# Patient Record
Sex: FEMALE | Race: WHITE | Employment: OTHER | URBAN - METROPOLITAN AREA
[De-identification: names, ages, dates, MRNs, and addresses within clinical notes are randomized per-mention and may not be internally consistent; named-entity substitution may affect disease eponyms.]

---

## 2019-01-01 ENCOUNTER — HOSPICE ADMISSION (OUTPATIENT)
Dept: HOSPICE | Facility: HOSPICE | Age: 74
End: 2019-01-01
Payer: MEDICARE

## 2019-01-01 ENCOUNTER — APPOINTMENT (OUTPATIENT)
Dept: GENERAL RADIOLOGY | Age: 74
DRG: 871 | End: 2019-01-01
Attending: FAMILY MEDICINE
Payer: MEDICARE

## 2019-01-01 ENCOUNTER — HOSPITAL ENCOUNTER (INPATIENT)
Age: 74
LOS: 6 days | Discharge: HOSPICE | DRG: 871 | End: 2019-09-18
Attending: EMERGENCY MEDICINE | Admitting: FAMILY MEDICINE
Payer: MEDICARE

## 2019-01-01 ENCOUNTER — APPOINTMENT (OUTPATIENT)
Dept: GENERAL RADIOLOGY | Age: 74
DRG: 871 | End: 2019-01-01
Attending: EMERGENCY MEDICINE
Payer: MEDICARE

## 2019-01-01 ENCOUNTER — APPOINTMENT (OUTPATIENT)
Dept: ULTRASOUND IMAGING | Age: 74
DRG: 871 | End: 2019-01-01
Attending: INTERNAL MEDICINE
Payer: MEDICARE

## 2019-01-01 ENCOUNTER — APPOINTMENT (OUTPATIENT)
Dept: GENERAL RADIOLOGY | Age: 74
DRG: 871 | End: 2019-01-01
Attending: INTERNAL MEDICINE
Payer: MEDICARE

## 2019-01-01 ENCOUNTER — HOSPITAL ENCOUNTER (INPATIENT)
Age: 74
LOS: 3 days | End: 2019-09-21
Attending: INTERNAL MEDICINE | Admitting: INTERNAL MEDICINE
Payer: MEDICARE

## 2019-01-01 VITALS
OXYGEN SATURATION: 96 % | TEMPERATURE: 101 F | WEIGHT: 142.2 LBS | RESPIRATION RATE: 16 BRPM | HEART RATE: 115 BPM | DIASTOLIC BLOOD PRESSURE: 52 MMHG | BODY MASS INDEX: 26.17 KG/M2 | SYSTOLIC BLOOD PRESSURE: 124 MMHG | HEIGHT: 62 IN

## 2019-01-01 VITALS
DIASTOLIC BLOOD PRESSURE: 51 MMHG | RESPIRATION RATE: 20 BRPM | HEART RATE: 103 BPM | SYSTOLIC BLOOD PRESSURE: 101 MMHG | TEMPERATURE: 98.9 F

## 2019-01-01 DIAGNOSIS — C25.8 OVERLAPPING MALIGNANT NEOPLASM OF PANCREAS (HCC): ICD-10-CM

## 2019-01-01 DIAGNOSIS — A41.9 SEPSIS, DUE TO UNSPECIFIED ORGANISM: ICD-10-CM

## 2019-01-01 DIAGNOSIS — M54.9 BILATERAL BACK PAIN, UNSPECIFIED BACK LOCATION, UNSPECIFIED CHRONICITY: ICD-10-CM

## 2019-01-01 DIAGNOSIS — R65.21 SEPTIC SHOCK (HCC): ICD-10-CM

## 2019-01-01 DIAGNOSIS — R91.8 PULMONARY INFILTRATES: ICD-10-CM

## 2019-01-01 DIAGNOSIS — R19.7 DIARRHEA, UNSPECIFIED TYPE: ICD-10-CM

## 2019-01-01 DIAGNOSIS — Z71.89 ADVANCED CARE PLANNING/COUNSELING DISCUSSION: ICD-10-CM

## 2019-01-01 DIAGNOSIS — N17.9 ACUTE KIDNEY INJURY (HCC): ICD-10-CM

## 2019-01-01 DIAGNOSIS — A41.9 SEVERE SEPSIS (HCC): Primary | ICD-10-CM

## 2019-01-01 DIAGNOSIS — R78.81 BACTEREMIA DUE TO GRAM-NEGATIVE BACTERIA: ICD-10-CM

## 2019-01-01 DIAGNOSIS — R53.81 DEBILITY: ICD-10-CM

## 2019-01-01 DIAGNOSIS — A41.9 SEPTIC SHOCK (HCC): ICD-10-CM

## 2019-01-01 DIAGNOSIS — J18.9 PNEUMONIA OF LEFT LOWER LOBE DUE TO INFECTIOUS ORGANISM: ICD-10-CM

## 2019-01-01 DIAGNOSIS — Z51.5 ENCOUNTER FOR PALLIATIVE CARE: ICD-10-CM

## 2019-01-01 DIAGNOSIS — R65.20 SEVERE SEPSIS (HCC): Primary | ICD-10-CM

## 2019-01-01 DIAGNOSIS — R41.82 ALTERED MENTAL STATUS, UNSPECIFIED ALTERED MENTAL STATUS TYPE: ICD-10-CM

## 2019-01-01 DIAGNOSIS — R62.7 FAILURE TO THRIVE IN ADULT: ICD-10-CM

## 2019-01-01 LAB
ACC. NO. FROM MICRO ORDER, ACCP: ABNORMAL
ALBUMIN SERPL-MCNC: 2.5 G/DL (ref 3.2–4.6)
ALBUMIN/GLOB SERPL: 0.8 {RATIO} (ref 1.2–3.5)
ALP SERPL-CCNC: 228 U/L (ref 50–136)
ALT SERPL-CCNC: 48 U/L (ref 12–65)
ANION GAP SERPL CALC-SCNC: 12 MMOL/L (ref 7–16)
ANION GAP SERPL CALC-SCNC: 13 MMOL/L (ref 7–16)
ANION GAP SERPL CALC-SCNC: 13 MMOL/L (ref 7–16)
ANION GAP SERPL CALC-SCNC: 15 MMOL/L (ref 7–16)
ANION GAP SERPL CALC-SCNC: 16 MMOL/L (ref 7–16)
ANION GAP SERPL CALC-SCNC: 21 MMOL/L (ref 7–16)
APPEARANCE UR: ABNORMAL
ARTERIAL PATENCY WRIST A: YES
AST SERPL-CCNC: 109 U/L (ref 15–37)
ATRIAL RATE: 138 BPM
BACTERIA SPEC CULT: ABNORMAL
BACTERIA SPEC CULT: ABNORMAL
BACTERIA SPEC CULT: NORMAL
BACTERIA SPEC CULT: NORMAL
BACTERIA URNS QL MICRO: 0 /HPF
BASE DEFICIT BLD-SCNC: 1 MMOL/L
BASE DEFICIT BLD-SCNC: 15 MMOL/L
BASE DEFICIT BLD-SCNC: 3 MMOL/L
BASE DEFICIT BLD-SCNC: 4 MMOL/L
BASOPHILS # BLD: 0 K/UL (ref 0–0.2)
BASOPHILS # BLD: 0.1 K/UL (ref 0–0.2)
BASOPHILS # BLD: 0.2 K/UL (ref 0–0.2)
BASOPHILS # BLD: 0.3 K/UL (ref 0–0.2)
BASOPHILS NFR BLD: 0 % (ref 0–2)
BASOPHILS NFR BLD: 1 % (ref 0–2)
BDY SITE: ABNORMAL
BDY SITE: NORMAL
BILIRUB SERPL-MCNC: 1.4 MG/DL (ref 0.2–1.1)
BILIRUB UR QL: NEGATIVE
BNP SERPL-MCNC: 4136 PG/ML
BODY TEMPERATURE: 98.6
BUN SERPL-MCNC: 18 MG/DL (ref 8–23)
BUN SERPL-MCNC: 22 MG/DL (ref 8–23)
BUN SERPL-MCNC: 25 MG/DL (ref 8–23)
BUN SERPL-MCNC: 28 MG/DL (ref 8–23)
BUN SERPL-MCNC: 41 MG/DL (ref 8–23)
BUN SERPL-MCNC: 61 MG/DL (ref 8–23)
C DIFF GDH STL QL: NORMAL
C DIFF TOX A+B STL QL IA: NORMAL
CALCIUM SERPL-MCNC: 6.9 MG/DL (ref 8.3–10.4)
CALCIUM SERPL-MCNC: 7 MG/DL (ref 8.3–10.4)
CALCIUM SERPL-MCNC: 7.1 MG/DL (ref 8.3–10.4)
CALCIUM SERPL-MCNC: 7.3 MG/DL (ref 8.3–10.4)
CALCIUM SERPL-MCNC: 7.9 MG/DL (ref 8.3–10.4)
CALCIUM SERPL-MCNC: 8.5 MG/DL (ref 8.3–10.4)
CALCULATED P AXIS, ECG09: 112 DEGREES
CALCULATED R AXIS, ECG10: 84 DEGREES
CALCULATED T AXIS, ECG11: 63 DEGREES
CASTS URNS QL MICRO: ABNORMAL /LPF
CHLORIDE SERPL-SCNC: 100 MMOL/L (ref 98–107)
CHLORIDE SERPL-SCNC: 100 MMOL/L (ref 98–107)
CHLORIDE SERPL-SCNC: 102 MMOL/L (ref 98–107)
CHLORIDE SERPL-SCNC: 103 MMOL/L (ref 98–107)
CHLORIDE SERPL-SCNC: 104 MMOL/L (ref 98–107)
CHLORIDE SERPL-SCNC: 99 MMOL/L (ref 98–107)
CLINICAL CONSIDERATION: NORMAL
CO2 BLD-SCNC: 10 MMOL/L
CO2 BLD-SCNC: 24 MMOL/L
CO2 BLD-SCNC: 25 MMOL/L
CO2 BLD-SCNC: 26 MMOL/L
CO2 SERPL-SCNC: 14 MMOL/L (ref 21–32)
CO2 SERPL-SCNC: 19 MMOL/L (ref 21–32)
CO2 SERPL-SCNC: 22 MMOL/L (ref 21–32)
CO2 SERPL-SCNC: 23 MMOL/L (ref 21–32)
CO2 SERPL-SCNC: 24 MMOL/L (ref 21–32)
CO2 SERPL-SCNC: 26 MMOL/L (ref 21–32)
COLLECT TIME,HTIME: 1750
COLLECT TIME,HTIME: 1940
COLLECT TIME,HTIME: 2335
COLLECT TIME,HTIME: 555
COLOR UR: YELLOW
CREAT SERPL-MCNC: 2.2 MG/DL (ref 0.6–1)
CREAT SERPL-MCNC: 2.5 MG/DL (ref 0.6–1)
CREAT SERPL-MCNC: 2.86 MG/DL (ref 0.6–1)
CREAT SERPL-MCNC: 3.18 MG/DL (ref 0.6–1)
CREAT SERPL-MCNC: 3.96 MG/DL (ref 0.6–1)
CREAT SERPL-MCNC: 4.75 MG/DL (ref 0.6–1)
CREAT UR-MCNC: 69.7 MG/DL
DIAGNOSIS, 93000: NORMAL
DIFFERENTIAL METHOD BLD: ABNORMAL
EOSINOPHIL # BLD: 0 K/UL (ref 0–0.8)
EOSINOPHIL # BLD: 0.1 K/UL (ref 0–0.8)
EOSINOPHIL # BLD: 0.1 K/UL (ref 0–0.8)
EOSINOPHIL # BLD: 1.2 K/UL (ref 0–0.8)
EOSINOPHIL NFR BLD: 0 % (ref 0.5–7.8)
EOSINOPHIL NFR BLD: 0 % (ref 0.5–7.8)
EOSINOPHIL NFR BLD: 1 % (ref 0.5–7.8)
EOSINOPHIL NFR BLD: 40 % (ref 0.5–7.8)
EPI CELLS #/AREA URNS HPF: ABNORMAL /HPF
ERYTHROCYTE [DISTWIDTH] IN BLOOD BY AUTOMATED COUNT: 13.7 % (ref 11.9–14.6)
ERYTHROCYTE [DISTWIDTH] IN BLOOD BY AUTOMATED COUNT: 13.9 % (ref 11.9–14.6)
ERYTHROCYTE [DISTWIDTH] IN BLOOD BY AUTOMATED COUNT: 14 % (ref 11.9–14.6)
ERYTHROCYTE [DISTWIDTH] IN BLOOD BY AUTOMATED COUNT: 14 % (ref 11.9–14.6)
ERYTHROCYTE [DISTWIDTH] IN BLOOD BY AUTOMATED COUNT: 14.2 % (ref 11.9–14.6)
FLOW RATE ISTAT,IFRATE: 2 L/MIN
FLOW RATE ISTAT,IFRATE: 6 L/MIN
FLOW RATE ISTAT,IFRATE: 8 L/MIN
FLOW RATE ISTAT,IFRATE: 8 L/MIN
GAS FLOW.O2 O2 DELIVERY SYS: ABNORMAL L/MIN
GAS FLOW.O2 O2 DELIVERY SYS: NORMAL L/MIN
GLOBULIN SER CALC-MCNC: 3.1 G/DL (ref 2.3–3.5)
GLUCOSE SERPL-MCNC: 111 MG/DL (ref 65–100)
GLUCOSE SERPL-MCNC: 114 MG/DL (ref 65–100)
GLUCOSE SERPL-MCNC: 72 MG/DL (ref 65–100)
GLUCOSE SERPL-MCNC: 77 MG/DL (ref 65–100)
GLUCOSE SERPL-MCNC: 78 MG/DL (ref 65–100)
GLUCOSE SERPL-MCNC: 90 MG/DL (ref 65–100)
GLUCOSE UR STRIP.AUTO-MCNC: NEGATIVE MG/DL
GRAM STN SPEC: ABNORMAL
HCO3 BLD-SCNC: 23 MMOL/L (ref 22–26)
HCO3 BLD-SCNC: 23.1 MMOL/L (ref 22–26)
HCO3 BLD-SCNC: 25 MMOL/L (ref 22–26)
HCO3 BLD-SCNC: 9.5 MMOL/L (ref 22–26)
HCT VFR BLD AUTO: 27.7 % (ref 35.8–46.3)
HCT VFR BLD AUTO: 28.4 % (ref 35.8–46.3)
HCT VFR BLD AUTO: 30.5 % (ref 35.8–46.3)
HCT VFR BLD AUTO: 30.9 % (ref 35.8–46.3)
HCT VFR BLD AUTO: 33.2 % (ref 35.8–46.3)
HGB BLD-MCNC: 10.2 G/DL (ref 11.7–15.4)
HGB BLD-MCNC: 8.9 G/DL (ref 11.7–15.4)
HGB BLD-MCNC: 9.1 G/DL (ref 11.7–15.4)
HGB BLD-MCNC: 9.6 G/DL (ref 11.7–15.4)
HGB BLD-MCNC: 9.7 G/DL (ref 11.7–15.4)
HGB UR QL STRIP: ABNORMAL
IMM GRANULOCYTES # BLD AUTO: 0.1 K/UL (ref 0–0.5)
IMM GRANULOCYTES # BLD AUTO: 1.3 K/UL (ref 0–0.5)
IMM GRANULOCYTES # BLD AUTO: 1.4 K/UL (ref 0–0.5)
IMM GRANULOCYTES # BLD AUTO: 3.5 K/UL (ref 0–0.5)
IMM GRANULOCYTES NFR BLD AUTO: 12 % (ref 0–5)
IMM GRANULOCYTES NFR BLD AUTO: 4 % (ref 0–5)
IMM GRANULOCYTES NFR BLD AUTO: 5 % (ref 0–5)
IMM GRANULOCYTES NFR BLD AUTO: 5 % (ref 0–5)
INTERPRETATION: ABNORMAL
INTERPRETATION: NORMAL
KETONES UR QL STRIP.AUTO: NEGATIVE MG/DL
KLEBSIELLA PNEUMONIAE: DETECTED
KPC (CARBAPENEM RESISTANCE GENE): NOT DETECTED
LACTATE BLD-SCNC: 12.11 MMOL/L (ref 0.5–1.9)
LACTATE BLD-SCNC: 5.58 MMOL/L (ref 0.5–1.9)
LACTATE SERPL-SCNC: 1.1 MMOL/L (ref 0.4–2)
LACTATE SERPL-SCNC: 4.1 MMOL/L (ref 0.4–2)
LACTATE SERPL-SCNC: 4.4 MMOL/L (ref 0.4–2)
LACTATE SERPL-SCNC: 6.1 MMOL/L (ref 0.4–2)
LACTATE SERPL-SCNC: 7.2 MMOL/L (ref 0.4–2)
LEUKOCYTE ESTERASE UR QL STRIP.AUTO: NEGATIVE
LYMPHOCYTES # BLD: 0.7 K/UL (ref 0.5–4.6)
LYMPHOCYTES # BLD: 0.7 K/UL (ref 0.5–4.6)
LYMPHOCYTES # BLD: 1.2 K/UL (ref 0.5–4.6)
LYMPHOCYTES # BLD: 1.4 K/UL (ref 0.5–4.6)
LYMPHOCYTES NFR BLD: 2 % (ref 13–44)
LYMPHOCYTES NFR BLD: 26 % (ref 13–44)
LYMPHOCYTES NFR BLD: 4 % (ref 13–44)
LYMPHOCYTES NFR BLD: 5 % (ref 13–44)
MCH RBC QN AUTO: 27.6 PG (ref 26.1–32.9)
MCH RBC QN AUTO: 27.7 PG (ref 26.1–32.9)
MCH RBC QN AUTO: 27.9 PG (ref 26.1–32.9)
MCH RBC QN AUTO: 28 PG (ref 26.1–32.9)
MCH RBC QN AUTO: 28.3 PG (ref 26.1–32.9)
MCHC RBC AUTO-ENTMCNC: 30.7 G/DL (ref 31.4–35)
MCHC RBC AUTO-ENTMCNC: 31.4 G/DL (ref 31.4–35)
MCHC RBC AUTO-ENTMCNC: 31.5 G/DL (ref 31.4–35)
MCHC RBC AUTO-ENTMCNC: 32 G/DL (ref 31.4–35)
MCHC RBC AUTO-ENTMCNC: 32.1 G/DL (ref 31.4–35)
MCV RBC AUTO: 87.1 FL (ref 79.6–97.8)
MCV RBC AUTO: 88 FL (ref 79.6–97.8)
MCV RBC AUTO: 88.2 FL (ref 79.6–97.8)
MCV RBC AUTO: 88.2 FL (ref 79.6–97.8)
MCV RBC AUTO: 90.7 FL (ref 79.6–97.8)
METAMYELOCYTES NFR BLD MANUAL: 1 %
MONOCYTES # BLD: 0 K/UL (ref 0.1–1.3)
MONOCYTES # BLD: 0.4 K/UL (ref 0.1–1.3)
MONOCYTES # BLD: 0.6 K/UL (ref 0.1–1.3)
MONOCYTES # BLD: 1 K/UL (ref 0.1–1.3)
MONOCYTES # BLD: 1.4 K/UL (ref 0.1–1.3)
MONOCYTES NFR BLD MANUAL: 3 % (ref 3–9)
MONOCYTES NFR BLD: 1 % (ref 4–12)
MONOCYTES NFR BLD: 2 % (ref 4–12)
MONOCYTES NFR BLD: 2 % (ref 4–12)
MONOCYTES NFR BLD: 4 % (ref 4–12)
NEUTS BAND NFR BLD MANUAL: 10 % (ref 0–10)
NEUTS SEG # BLD: 0.8 K/UL (ref 1.7–8.2)
NEUTS SEG # BLD: 23 K/UL (ref 1.7–8.2)
NEUTS SEG # BLD: 24.9 K/UL (ref 1.7–8.2)
NEUTS SEG # BLD: 32.3 K/UL (ref 1.7–8.2)
NEUTS SEG # BLD: 32.4 K/UL (ref 1.7–8.2)
NEUTS SEG NFR BLD MANUAL: 86 % (ref 47–75)
NEUTS SEG NFR BLD: 28 % (ref 43–78)
NEUTS SEG NFR BLD: 80 % (ref 43–78)
NEUTS SEG NFR BLD: 89 % (ref 43–78)
NEUTS SEG NFR BLD: 90 % (ref 43–78)
NITRITE UR QL STRIP.AUTO: NEGATIVE
NRBC # BLD: 0 K/UL (ref 0–0.2)
NRBC # BLD: 0 K/UL (ref 0–0.2)
NRBC # BLD: 0.02 K/UL (ref 0–0.2)
NRBC # BLD: 0.02 K/UL (ref 0–0.2)
NRBC # BLD: 0.04 K/UL (ref 0–0.2)
P-R INTERVAL, ECG05: 132 MS
PCO2 BLD: 18.9 MMHG (ref 35–45)
PCO2 BLD: 44.1 MMHG (ref 35–45)
PCO2 BLD: 44.2 MMHG (ref 35–45)
PCO2 BLD: 50.3 MMHG (ref 35–45)
PCR REFLEX: NORMAL
PH BLD: 7.27 [PH] (ref 7.35–7.45)
PH BLD: 7.31 [PH] (ref 7.35–7.45)
PH BLD: 7.32 [PH] (ref 7.35–7.45)
PH BLD: 7.36 [PH] (ref 7.35–7.45)
PH UR STRIP: 6 [PH] (ref 5–9)
PLATELET # BLD AUTO: 185 K/UL (ref 150–450)
PLATELET # BLD AUTO: 201 K/UL (ref 150–450)
PLATELET # BLD AUTO: 255 K/UL (ref 150–450)
PLATELET # BLD AUTO: 259 K/UL (ref 150–450)
PLATELET # BLD AUTO: 296 K/UL (ref 150–450)
PLATELET COMMENTS,PCOM: ADEQUATE
PMV BLD AUTO: 10 FL (ref 9.4–12.3)
PMV BLD AUTO: 10.4 FL (ref 9.4–12.3)
PMV BLD AUTO: 9 FL (ref 9.4–12.3)
PO2 BLD: 78 MMHG (ref 75–100)
PO2 BLD: 78 MMHG (ref 75–100)
PO2 BLD: 83 MMHG (ref 75–100)
PO2 BLD: 94 MMHG (ref 75–100)
POTASSIUM SERPL-SCNC: 3.1 MMOL/L (ref 3.5–5.1)
POTASSIUM SERPL-SCNC: 3.2 MMOL/L (ref 3.5–5.1)
POTASSIUM SERPL-SCNC: 3.3 MMOL/L (ref 3.5–5.1)
POTASSIUM SERPL-SCNC: 3.5 MMOL/L (ref 3.5–5.1)
POTASSIUM SERPL-SCNC: 3.9 MMOL/L (ref 3.5–5.1)
POTASSIUM SERPL-SCNC: 4.4 MMOL/L (ref 3.5–5.1)
PROCALCITONIN SERPL-MCNC: 31.5 NG/ML
PROT SERPL-MCNC: 5.6 G/DL (ref 6.3–8.2)
PROT UR STRIP-MCNC: 30 MG/DL
Q-T INTERVAL, ECG07: 364 MS
QRS DURATION, ECG06: 76 MS
QTC CALCULATION (BEZET), ECG08: 549 MS
RBC # BLD AUTO: 3.18 M/UL (ref 4.05–5.2)
RBC # BLD AUTO: 3.22 M/UL (ref 4.05–5.2)
RBC # BLD AUTO: 3.46 M/UL (ref 4.05–5.2)
RBC # BLD AUTO: 3.51 M/UL (ref 4.05–5.2)
RBC # BLD AUTO: 3.66 M/UL (ref 4.05–5.2)
RBC #/AREA URNS HPF: ABNORMAL /HPF
RBC MORPH BLD: ABNORMAL
SAO2 % BLD: 93 % (ref 95–98)
SAO2 % BLD: 95 % (ref 95–98)
SAO2 % BLD: 95 % (ref 95–98)
SAO2 % BLD: 97 % (ref 95–98)
SERVICE CMNT-IMP: ABNORMAL
SERVICE CMNT-IMP: NORMAL
SODIUM SERPL-SCNC: 136 MMOL/L (ref 136–145)
SODIUM SERPL-SCNC: 137 MMOL/L (ref 136–145)
SODIUM SERPL-SCNC: 138 MMOL/L (ref 136–145)
SODIUM SERPL-SCNC: 139 MMOL/L (ref 136–145)
SODIUM UR-SCNC: 81 MMOL/L
SP GR UR REFRACTOMETRY: 1.01 (ref 1–1.02)
SPECIMEN TYPE: ABNORMAL
SPECIMEN TYPE: NORMAL
UROBILINOGEN UR QL STRIP.AUTO: 0.2 EU/DL (ref 0.2–1)
VANCOMYCIN SERPL-MCNC: 17.7 UG/ML
VANCOMYCIN SERPL-MCNC: 22.5 UG/ML
VENTRICULAR RATE, ECG03: 137 BPM
WBC # BLD AUTO: 2.8 K/UL (ref 4.3–11.1)
WBC # BLD AUTO: 28.1 K/UL (ref 4.3–11.1)
WBC # BLD AUTO: 28.8 K/UL (ref 4.3–11.1)
WBC # BLD AUTO: 33.4 K/UL (ref 4.3–11.1)
WBC # BLD AUTO: 36 K/UL (ref 4.3–11.1)
WBC MORPH BLD: ABNORMAL
WBC MORPH BLD: ABNORMAL
WBC URNS QL MICRO: ABNORMAL /HPF

## 2019-01-01 PROCEDURE — 74011000258 HC RX REV CODE- 258: Performed by: INTERNAL MEDICINE

## 2019-01-01 PROCEDURE — 65270000029 HC RM PRIVATE

## 2019-01-01 PROCEDURE — 0656 HSPC GENERAL INPATIENT

## 2019-01-01 PROCEDURE — 74011250636 HC RX REV CODE- 250/636: Performed by: INTERNAL MEDICINE

## 2019-01-01 PROCEDURE — 77030020263 HC SOL INJ SOD CL0.9% LFCR 1000ML

## 2019-01-01 PROCEDURE — 36600 WITHDRAWAL OF ARTERIAL BLOOD: CPT

## 2019-01-01 PROCEDURE — 74011000258 HC RX REV CODE- 258: Performed by: FAMILY MEDICINE

## 2019-01-01 PROCEDURE — 77030018846 HC SOL IRR STRL H20 ICUM -A

## 2019-01-01 PROCEDURE — 74011000250 HC RX REV CODE- 250: Performed by: NURSE PRACTITIONER

## 2019-01-01 PROCEDURE — 71045 X-RAY EXAM CHEST 1 VIEW: CPT

## 2019-01-01 PROCEDURE — 77030005518 HC CATH URETH FOL 2W BARD -B

## 2019-01-01 PROCEDURE — 74011250636 HC RX REV CODE- 250/636: Performed by: NURSE PRACTITIONER

## 2019-01-01 PROCEDURE — 83605 ASSAY OF LACTIC ACID: CPT

## 2019-01-01 PROCEDURE — G0155 HHCP-SVS OF CSW,EA 15 MIN: HCPCS

## 2019-01-01 PROCEDURE — 74011000250 HC RX REV CODE- 250

## 2019-01-01 PROCEDURE — 74011000250 HC RX REV CODE- 250: Performed by: INTERNAL MEDICINE

## 2019-01-01 PROCEDURE — 77010033678 HC OXYGEN DAILY

## 2019-01-01 PROCEDURE — 02HV33Z INSERTION OF INFUSION DEVICE INTO SUPERIOR VENA CAVA, PERCUTANEOUS APPROACH: ICD-10-PCS | Performed by: INTERNAL MEDICINE

## 2019-01-01 PROCEDURE — 36592 COLLECT BLOOD FROM PICC: CPT

## 2019-01-01 PROCEDURE — 94640 AIRWAY INHALATION TREATMENT: CPT

## 2019-01-01 PROCEDURE — 76450000000

## 2019-01-01 PROCEDURE — 99223 1ST HOSP IP/OBS HIGH 75: CPT | Performed by: INTERNAL MEDICINE

## 2019-01-01 PROCEDURE — 87186 SC STD MICRODIL/AGAR DIL: CPT

## 2019-01-01 PROCEDURE — G0299 HHS/HOSPICE OF RN EA 15 MIN: HCPCS

## 2019-01-01 PROCEDURE — 77030027688 HC DRSG MEPILEX 16-48IN NO BORD MOLN -A

## 2019-01-01 PROCEDURE — 99223 1ST HOSP IP/OBS HIGH 75: CPT | Performed by: NURSE PRACTITIONER

## 2019-01-01 PROCEDURE — G0156 HHCP-SVS OF AIDE,EA 15 MIN: HCPCS

## 2019-01-01 PROCEDURE — 74011250636 HC RX REV CODE- 250/636: Performed by: FAMILY MEDICINE

## 2019-01-01 PROCEDURE — 36415 COLL VENOUS BLD VENIPUNCTURE: CPT

## 2019-01-01 PROCEDURE — 87150 DNA/RNA AMPLIFIED PROBE: CPT

## 2019-01-01 PROCEDURE — 77030019605

## 2019-01-01 PROCEDURE — 85025 COMPLETE CBC W/AUTO DIFF WBC: CPT

## 2019-01-01 PROCEDURE — 82570 ASSAY OF URINE CREATININE: CPT

## 2019-01-01 PROCEDURE — 80202 ASSAY OF VANCOMYCIN: CPT

## 2019-01-01 PROCEDURE — 82803 BLOOD GASES ANY COMBINATION: CPT

## 2019-01-01 PROCEDURE — 74011250637 HC RX REV CODE- 250/637: Performed by: FAMILY MEDICINE

## 2019-01-01 PROCEDURE — 87077 CULTURE AEROBIC IDENTIFY: CPT

## 2019-01-01 PROCEDURE — 74011000250 HC RX REV CODE- 250: Performed by: FAMILY MEDICINE

## 2019-01-01 PROCEDURE — 74011250637 HC RX REV CODE- 250/637: Performed by: NURSE PRACTITIONER

## 2019-01-01 PROCEDURE — 65620000000 HC RM CCU GENERAL

## 2019-01-01 PROCEDURE — 80048 BASIC METABOLIC PNL TOTAL CA: CPT

## 2019-01-01 PROCEDURE — 99285 EMERGENCY DEPT VISIT HI MDM: CPT | Performed by: EMERGENCY MEDICINE

## 2019-01-01 PROCEDURE — 99233 SBSQ HOSP IP/OBS HIGH 50: CPT | Performed by: INTERNAL MEDICINE

## 2019-01-01 PROCEDURE — 99231 SBSQ HOSP IP/OBS SF/LOW 25: CPT | Performed by: NURSE PRACTITIONER

## 2019-01-01 PROCEDURE — 99291 CRITICAL CARE FIRST HOUR: CPT | Performed by: INTERNAL MEDICINE

## 2019-01-01 PROCEDURE — 83880 ASSAY OF NATRIURETIC PEPTIDE: CPT

## 2019-01-01 PROCEDURE — 76775 US EXAM ABDO BACK WALL LIM: CPT

## 2019-01-01 PROCEDURE — 77030031642 HC BOOT FT ROOKE HFS OSBM -B

## 2019-01-01 PROCEDURE — 80053 COMPREHEN METABOLIC PANEL: CPT

## 2019-01-01 PROCEDURE — 77030020255 HC SOL INJ LR 1000ML BG

## 2019-01-01 PROCEDURE — 96360 HYDRATION IV INFUSION INIT: CPT | Performed by: EMERGENCY MEDICINE

## 2019-01-01 PROCEDURE — C1751 CATH, INF, PER/CENT/MIDLINE: HCPCS

## 2019-01-01 PROCEDURE — 3336500001 HSPC ELECTION

## 2019-01-01 PROCEDURE — 87040 BLOOD CULTURE FOR BACTERIA: CPT

## 2019-01-01 PROCEDURE — 84300 ASSAY OF URINE SODIUM: CPT

## 2019-01-01 PROCEDURE — 36556 INSERT NON-TUNNEL CV CATH: CPT

## 2019-01-01 PROCEDURE — 87205 SMEAR GRAM STAIN: CPT

## 2019-01-01 PROCEDURE — 36556 INSERT NON-TUNNEL CV CATH: CPT | Performed by: INTERNAL MEDICINE

## 2019-01-01 PROCEDURE — 99232 SBSQ HOSP IP/OBS MODERATE 35: CPT | Performed by: INTERNAL MEDICINE

## 2019-01-01 PROCEDURE — 74011250636 HC RX REV CODE- 250/636

## 2019-01-01 PROCEDURE — 93005 ELECTROCARDIOGRAM TRACING: CPT | Performed by: EMERGENCY MEDICINE

## 2019-01-01 PROCEDURE — 87449 NOS EACH ORGANISM AG IA: CPT

## 2019-01-01 PROCEDURE — 81001 URINALYSIS AUTO W/SCOPE: CPT

## 2019-01-01 PROCEDURE — 84145 PROCALCITONIN (PCT): CPT

## 2019-01-01 PROCEDURE — 74011250636 HC RX REV CODE- 250/636: Performed by: EMERGENCY MEDICINE

## 2019-01-01 RX ORDER — SODIUM BICARBONATE 84 MG/ML
50 INJECTION, SOLUTION INTRAVENOUS ONCE
Status: COMPLETED | OUTPATIENT
Start: 2019-01-01 | End: 2019-01-01

## 2019-01-01 RX ORDER — LANOLIN ALCOHOL/MO/W.PET/CERES
1000 CREAM (GRAM) TOPICAL DAILY
COMMUNITY

## 2019-01-01 RX ORDER — CALCIUM CARBONATE 500(1250)
500 TABLET ORAL DAILY
Status: DISCONTINUED | OUTPATIENT
Start: 2019-01-01 | End: 2019-01-01

## 2019-01-01 RX ORDER — HYDROMORPHONE HYDROCHLORIDE 1 MG/ML
0.5 INJECTION, SOLUTION INTRAMUSCULAR; INTRAVENOUS; SUBCUTANEOUS
Status: DISCONTINUED | OUTPATIENT
Start: 2019-01-01 | End: 2019-01-01 | Stop reason: HOSPADM

## 2019-01-01 RX ORDER — MORPHINE SULFATE 2 MG/ML
2 INJECTION, SOLUTION INTRAMUSCULAR; INTRAVENOUS
Status: DISCONTINUED | OUTPATIENT
Start: 2019-01-01 | End: 2019-01-01

## 2019-01-01 RX ORDER — LIDOCAINE 4 G/100G
1 PATCH TOPICAL EVERY 24 HOURS
Status: DISCONTINUED | OUTPATIENT
Start: 2019-01-01 | End: 2019-01-01 | Stop reason: HOSPADM

## 2019-01-01 RX ORDER — CARVEDILOL 6.25 MG/1
6.25 TABLET ORAL 2 TIMES DAILY WITH MEALS
Status: DISCONTINUED | OUTPATIENT
Start: 2019-01-01 | End: 2019-01-01

## 2019-01-01 RX ORDER — TOLTERODINE 4 MG/1
4 CAPSULE, EXTENDED RELEASE ORAL DAILY
Refills: 5 | COMMUNITY
Start: 2019-01-01

## 2019-01-01 RX ORDER — GUAIFENESIN 100 MG/5ML
81 LIQUID (ML) ORAL DAILY
Status: DISCONTINUED | OUTPATIENT
Start: 2019-01-01 | End: 2019-01-01

## 2019-01-01 RX ORDER — MORPHINE SULFATE 2 MG/ML
2 INJECTION, SOLUTION INTRAMUSCULAR; INTRAVENOUS
Status: DISCONTINUED | OUTPATIENT
Start: 2019-01-01 | End: 2019-01-01 | Stop reason: HOSPADM

## 2019-01-01 RX ORDER — MELOXICAM 7.5 MG/1
7.5 TABLET ORAL DAILY
COMMUNITY

## 2019-01-01 RX ORDER — MORPHINE SULFATE 2 MG/ML
1 INJECTION, SOLUTION INTRAMUSCULAR; INTRAVENOUS ONCE
Status: COMPLETED | OUTPATIENT
Start: 2019-01-01 | End: 2019-01-01

## 2019-01-01 RX ORDER — ROSUVASTATIN CALCIUM 5 MG/1
20 TABLET, COATED ORAL
Status: DISCONTINUED | OUTPATIENT
Start: 2019-01-01 | End: 2019-01-01

## 2019-01-01 RX ORDER — MORPHINE SULFATE 2 MG/ML
1 INJECTION, SOLUTION INTRAMUSCULAR; INTRAVENOUS
Status: DISCONTINUED | OUTPATIENT
Start: 2019-01-01 | End: 2019-01-01

## 2019-01-01 RX ORDER — NOREPINEPHRINE BIT/0.9 % NACL 4MG/250ML
0-30 PLASTIC BAG, INJECTION (ML) INTRAVENOUS
Status: DISCONTINUED | OUTPATIENT
Start: 2019-01-01 | End: 2019-01-01 | Stop reason: SDUPTHER

## 2019-01-01 RX ORDER — SODIUM CHLORIDE 0.9 % (FLUSH) 0.9 %
10 SYRINGE (ML) INJECTION EVERY 12 HOURS
Status: DISCONTINUED | OUTPATIENT
Start: 2019-01-01 | End: 2019-01-01 | Stop reason: HOSPADM

## 2019-01-01 RX ORDER — LEVOTHYROXINE SODIUM 75 UG/1
75 TABLET ORAL
COMMUNITY
Start: 2019-01-01

## 2019-01-01 RX ORDER — LYSINE HCL 500 MG
1 TABLET ORAL DAILY
COMMUNITY

## 2019-01-01 RX ORDER — SODIUM CHLORIDE 0.9 % (FLUSH) 0.9 %
3 SYRINGE (ML) INJECTION EVERY 12 HOURS
Status: DISCONTINUED | OUTPATIENT
Start: 2019-01-01 | End: 2019-01-01

## 2019-01-01 RX ORDER — HYDROCODONE BITARTRATE AND ACETAMINOPHEN 5; 325 MG/1; MG/1
1 TABLET ORAL
Status: DISCONTINUED | OUTPATIENT
Start: 2019-01-01 | End: 2019-01-01

## 2019-01-01 RX ORDER — HEPARIN 100 UNIT/ML
300 SYRINGE INTRAVENOUS EVERY 12 HOURS
Status: DISCONTINUED | OUTPATIENT
Start: 2019-01-01 | End: 2019-01-01 | Stop reason: HOSPADM

## 2019-01-01 RX ORDER — LORAZEPAM 2 MG/ML
0.5 INJECTION INTRAMUSCULAR ONCE
Status: ACTIVE | OUTPATIENT
Start: 2019-01-01 | End: 2019-01-01

## 2019-01-01 RX ORDER — VANCOMYCIN/0.9 % SOD CHLORIDE 1.5G/250ML
1500 PLASTIC BAG, INJECTION (ML) INTRAVENOUS ONCE
Status: COMPLETED | OUTPATIENT
Start: 2019-01-01 | End: 2019-01-01

## 2019-01-01 RX ORDER — HALOPERIDOL 5 MG/ML
2 INJECTION INTRAMUSCULAR
Status: DISCONTINUED | OUTPATIENT
Start: 2019-01-01 | End: 2019-01-01 | Stop reason: HOSPADM

## 2019-01-01 RX ORDER — HEPARIN SODIUM 5000 [USP'U]/ML
5000 INJECTION, SOLUTION INTRAVENOUS; SUBCUTANEOUS EVERY 8 HOURS
Status: DISCONTINUED | OUTPATIENT
Start: 2019-01-01 | End: 2019-01-01

## 2019-01-01 RX ORDER — ERGOCALCIFEROL 1.25 MG/1
50000 CAPSULE ORAL
COMMUNITY

## 2019-01-01 RX ORDER — LANOLIN ALCOHOL/MO/W.PET/CERES
1000 CREAM (GRAM) TOPICAL DAILY
Status: DISCONTINUED | OUTPATIENT
Start: 2019-01-01 | End: 2019-01-01

## 2019-01-01 RX ORDER — LORAZEPAM 2 MG/ML
INJECTION INTRAMUSCULAR
Status: COMPLETED
Start: 2019-01-01 | End: 2019-01-01

## 2019-01-01 RX ORDER — ROSUVASTATIN CALCIUM 20 MG/1
20 TABLET, COATED ORAL
COMMUNITY
Start: 2019-01-01

## 2019-01-01 RX ORDER — OXYBUTYNIN CHLORIDE 5 MG/1
5 TABLET, EXTENDED RELEASE ORAL DAILY
Status: DISCONTINUED | OUTPATIENT
Start: 2019-01-01 | End: 2019-01-01

## 2019-01-01 RX ORDER — ACETAMINOPHEN 325 MG/1
650 TABLET ORAL
Status: DISCONTINUED | OUTPATIENT
Start: 2019-01-01 | End: 2019-01-01 | Stop reason: HOSPADM

## 2019-01-01 RX ORDER — GUAIFENESIN 100 MG/5ML
81 LIQUID (ML) ORAL DAILY
COMMUNITY

## 2019-01-01 RX ORDER — SODIUM CHLORIDE 0.9 % (FLUSH) 0.9 %
10 SYRINGE (ML) INJECTION AS NEEDED
Status: DISCONTINUED | OUTPATIENT
Start: 2019-01-01 | End: 2019-01-01 | Stop reason: HOSPADM

## 2019-01-01 RX ORDER — HYDROGEN PEROXIDE 3 %
20 SOLUTION, NON-ORAL MISCELLANEOUS DAILY
COMMUNITY

## 2019-01-01 RX ORDER — FACIAL-BODY WIPES
10 EACH TOPICAL AS NEEDED
Status: DISCONTINUED | OUTPATIENT
Start: 2019-01-01 | End: 2019-01-01 | Stop reason: HOSPADM

## 2019-01-01 RX ORDER — GLUCOSAMINE/CHONDR SU A SOD 750-600 MG
1 TABLET ORAL DAILY
COMMUNITY

## 2019-01-01 RX ORDER — SODIUM CHLORIDE 0.9 % (FLUSH) 0.9 %
5-40 SYRINGE (ML) INJECTION EVERY 8 HOURS
Status: DISCONTINUED | OUTPATIENT
Start: 2019-01-01 | End: 2019-01-01

## 2019-01-01 RX ORDER — VALSARTAN 160 MG/1
160 TABLET ORAL DAILY
COMMUNITY

## 2019-01-01 RX ORDER — LORAZEPAM 2 MG/ML
0.5 INJECTION INTRAMUSCULAR
Status: DISCONTINUED | OUTPATIENT
Start: 2019-01-01 | End: 2019-01-01 | Stop reason: HOSPADM

## 2019-01-01 RX ORDER — LORAZEPAM 2 MG/ML
2 INJECTION INTRAMUSCULAR ONCE
Status: COMPLETED | OUTPATIENT
Start: 2019-01-01 | End: 2019-01-01

## 2019-01-01 RX ORDER — ALBUTEROL SULFATE 0.83 MG/ML
2.5 SOLUTION RESPIRATORY (INHALATION)
Status: DISPENSED | OUTPATIENT
Start: 2019-01-01 | End: 2019-01-01

## 2019-01-01 RX ORDER — SODIUM CHLORIDE 0.9 % (FLUSH) 0.9 %
3 SYRINGE (ML) INJECTION AS NEEDED
Status: DISCONTINUED | OUTPATIENT
Start: 2019-01-01 | End: 2019-01-01

## 2019-01-01 RX ORDER — HEPARIN 100 UNIT/ML
300 SYRINGE INTRAVENOUS AS NEEDED
Status: DISCONTINUED | OUTPATIENT
Start: 2019-01-01 | End: 2019-01-01 | Stop reason: HOSPADM

## 2019-01-01 RX ORDER — NOREPINEPHRINE BIT/0.9 % NACL 4MG/250ML
PLASTIC BAG, INJECTION (ML) INTRAVENOUS
Status: COMPLETED
Start: 2019-01-01 | End: 2019-01-01

## 2019-01-01 RX ORDER — TIZANIDINE 4 MG/1
4 TABLET ORAL
COMMUNITY

## 2019-01-01 RX ORDER — PHENYLEPHRINE HCL IN 0.9% NACL 30MG/250ML
10-100 PLASTIC BAG, INJECTION (ML) INTRAVENOUS
Status: DISCONTINUED | OUTPATIENT
Start: 2019-01-01 | End: 2019-01-01 | Stop reason: SDUPTHER

## 2019-01-01 RX ORDER — TIZANIDINE 2 MG/1
4 TABLET ORAL
Status: DISCONTINUED | OUTPATIENT
Start: 2019-01-01 | End: 2019-01-01

## 2019-01-01 RX ORDER — LEVOTHYROXINE SODIUM 75 UG/1
75 TABLET ORAL
Status: DISCONTINUED | OUTPATIENT
Start: 2019-01-01 | End: 2019-01-01

## 2019-01-01 RX ORDER — TITANIUM DIOXIDE, OCTINOXATE, ZINC OXIDE 4.61; 1.6; .78 G/40ML; G/40ML; G/40ML
400 CREAM TOPICAL DAILY
COMMUNITY

## 2019-01-01 RX ORDER — SODIUM CHLORIDE 9 MG/ML
50 INJECTION, SOLUTION INTRAVENOUS CONTINUOUS
Status: DISCONTINUED | OUTPATIENT
Start: 2019-01-01 | End: 2019-01-01

## 2019-01-01 RX ORDER — CARVEDILOL 6.25 MG/1
6.25 TABLET ORAL 2 TIMES DAILY WITH MEALS
COMMUNITY

## 2019-01-01 RX ORDER — MELOXICAM 7.5 MG/1
7.5 TABLET ORAL DAILY
Status: DISCONTINUED | OUTPATIENT
Start: 2019-01-01 | End: 2019-01-01

## 2019-01-01 RX ORDER — ONDANSETRON 2 MG/ML
4 INJECTION INTRAMUSCULAR; INTRAVENOUS
Status: DISCONTINUED | OUTPATIENT
Start: 2019-01-01 | End: 2019-01-01 | Stop reason: HOSPADM

## 2019-01-01 RX ORDER — GLYCOPYRROLATE 0.2 MG/ML
0.2 INJECTION INTRAMUSCULAR; INTRAVENOUS
Status: DISCONTINUED | OUTPATIENT
Start: 2019-01-01 | End: 2019-01-01 | Stop reason: HOSPADM

## 2019-01-01 RX ORDER — ZINC GLUCONATE 10 MG
250 LOZENGE ORAL DAILY
COMMUNITY

## 2019-01-01 RX ORDER — SODIUM CHLORIDE 0.9 % (FLUSH) 0.9 %
5-40 SYRINGE (ML) INJECTION AS NEEDED
Status: DISCONTINUED | OUTPATIENT
Start: 2019-01-01 | End: 2019-01-01 | Stop reason: HOSPADM

## 2019-01-01 RX ORDER — HYDROCORTISONE SODIUM SUCCINATE 100 MG/2ML
50 INJECTION, POWDER, FOR SOLUTION INTRAMUSCULAR; INTRAVENOUS EVERY 6 HOURS
Status: DISCONTINUED | OUTPATIENT
Start: 2019-01-01 | End: 2019-01-01

## 2019-01-01 RX ORDER — ALBUTEROL SULFATE 0.83 MG/ML
2.5 SOLUTION RESPIRATORY (INHALATION)
Status: DISCONTINUED | OUTPATIENT
Start: 2019-01-01 | End: 2019-01-01 | Stop reason: HOSPADM

## 2019-01-01 RX ORDER — SODIUM CHLORIDE 9 MG/ML
150 INJECTION, SOLUTION INTRAVENOUS CONTINUOUS
Status: DISCONTINUED | OUTPATIENT
Start: 2019-01-01 | End: 2019-01-01

## 2019-01-01 RX ORDER — ACETAMINOPHEN 650 MG/1
650 SUPPOSITORY RECTAL
Status: DISCONTINUED | OUTPATIENT
Start: 2019-01-01 | End: 2019-01-01 | Stop reason: HOSPADM

## 2019-01-01 RX ADMIN — GLYCOPYRROLATE 0.2 MG: 0.2 INJECTION, SOLUTION INTRAMUSCULAR; INTRAVENOUS at 04:18

## 2019-01-01 RX ADMIN — HALOPERIDOL LACTATE 2 MG: 5 INJECTION, SOLUTION INTRAMUSCULAR at 10:04

## 2019-01-01 RX ADMIN — MORPHINE SULFATE 2 MG: 2 INJECTION, SOLUTION INTRAMUSCULAR; INTRAVENOUS at 22:13

## 2019-01-01 RX ADMIN — VANCOMYCIN HYDROCHLORIDE 1500 MG: 10 INJECTION, POWDER, LYOPHILIZED, FOR SOLUTION INTRAVENOUS at 01:07

## 2019-01-01 RX ADMIN — HEPARIN SODIUM 5000 UNITS: 5000 INJECTION INTRAVENOUS; SUBCUTANEOUS at 05:29

## 2019-01-01 RX ADMIN — Medication 10 ML: at 05:02

## 2019-01-01 RX ADMIN — HALOPERIDOL LACTATE 2 MG: 5 INJECTION, SOLUTION INTRAMUSCULAR at 04:24

## 2019-01-01 RX ADMIN — Medication 10 ML: at 13:10

## 2019-01-01 RX ADMIN — MORPHINE SULFATE 2 MG: 2 INJECTION, SOLUTION INTRAMUSCULAR; INTRAVENOUS at 14:39

## 2019-01-01 RX ADMIN — HEPARIN SODIUM 5000 UNITS: 5000 INJECTION INTRAVENOUS; SUBCUTANEOUS at 13:09

## 2019-01-01 RX ADMIN — SODIUM CHLORIDE, SODIUM LACTATE, POTASSIUM CHLORIDE, AND CALCIUM CHLORIDE 1000 ML: 600; 310; 30; 20 INJECTION, SOLUTION INTRAVENOUS at 07:28

## 2019-01-01 RX ADMIN — SODIUM CHLORIDE, PRESERVATIVE FREE 10 ML: 5 INJECTION INTRAVENOUS at 20:26

## 2019-01-01 RX ADMIN — NOREPINEPHRINE BITARTRATE 16 MCG/MIN: 1 INJECTION INTRAVENOUS at 10:18

## 2019-01-01 RX ADMIN — HALOPERIDOL LACTATE 2 MG: 5 INJECTION, SOLUTION INTRAMUSCULAR at 08:35

## 2019-01-01 RX ADMIN — HEPARIN SODIUM 5000 UNITS: 5000 INJECTION INTRAVENOUS; SUBCUTANEOUS at 05:55

## 2019-01-01 RX ADMIN — Medication 30 MCG/MIN: at 17:55

## 2019-01-01 RX ADMIN — MORPHINE SULFATE 2 MG: 2 INJECTION, SOLUTION INTRAMUSCULAR; INTRAVENOUS at 15:56

## 2019-01-01 RX ADMIN — HYDROCORTISONE SODIUM SUCCINATE 50 MG: 100 INJECTION, POWDER, FOR SOLUTION INTRAMUSCULAR; INTRAVENOUS at 23:12

## 2019-01-01 RX ADMIN — HALOPERIDOL LACTATE 2 MG: 5 INJECTION, SOLUTION INTRAMUSCULAR at 23:49

## 2019-01-01 RX ADMIN — MORPHINE SULFATE 2 MG: 2 INJECTION, SOLUTION INTRAMUSCULAR; INTRAVENOUS at 15:46

## 2019-01-01 RX ADMIN — SODIUM CHLORIDE 50 ML/HR: 900 INJECTION, SOLUTION INTRAVENOUS at 09:30

## 2019-01-01 RX ADMIN — HYDROMORPHONE HYDROCHLORIDE 0.5 MG: 1 INJECTION, SOLUTION INTRAMUSCULAR; INTRAVENOUS; SUBCUTANEOUS at 05:13

## 2019-01-01 RX ADMIN — MORPHINE SULFATE 1 MG: 2 INJECTION, SOLUTION INTRAMUSCULAR; INTRAVENOUS at 09:14

## 2019-01-01 RX ADMIN — HYDROCORTISONE SODIUM SUCCINATE 50 MG: 100 INJECTION, POWDER, FOR SOLUTION INTRAMUSCULAR; INTRAVENOUS at 17:12

## 2019-01-01 RX ADMIN — SODIUM BICARBONATE: 84 INJECTION, SOLUTION INTRAVENOUS at 21:55

## 2019-01-01 RX ADMIN — HYDROCORTISONE SODIUM SUCCINATE 50 MG: 100 INJECTION, POWDER, FOR SOLUTION INTRAMUSCULAR; INTRAVENOUS at 05:02

## 2019-01-01 RX ADMIN — HEPARIN SODIUM 5000 UNITS: 5000 INJECTION INTRAVENOUS; SUBCUTANEOUS at 21:17

## 2019-01-01 RX ADMIN — NOREPINEPHRINE BITARTRATE 30 MCG/MIN: 1 INJECTION INTRAVENOUS at 05:49

## 2019-01-01 RX ADMIN — VASOPRESSIN 0.03 UNITS/MIN: 20 INJECTION INTRAVENOUS at 06:02

## 2019-01-01 RX ADMIN — SODIUM CHLORIDE, PRESERVATIVE FREE 10 ML: 5 INJECTION INTRAVENOUS at 10:05

## 2019-01-01 RX ADMIN — VASOPRESSIN 0.03 UNITS/MIN: 20 INJECTION INTRAVENOUS at 18:15

## 2019-01-01 RX ADMIN — HYDROCODONE BITARTRATE AND ACETAMINOPHEN 1 TABLET: 5; 325 TABLET ORAL at 08:20

## 2019-01-01 RX ADMIN — Medication 10 ML: at 02:05

## 2019-01-01 RX ADMIN — ALBUTEROL SULFATE 2.5 MG: 2.5 SOLUTION RESPIRATORY (INHALATION) at 13:15

## 2019-01-01 RX ADMIN — OXYBUTYNIN CHLORIDE 5 MG: 5 TABLET, EXTENDED RELEASE ORAL at 08:20

## 2019-01-01 RX ADMIN — LORAZEPAM 0.5 MG: 2 INJECTION INTRAMUSCULAR at 10:23

## 2019-01-01 RX ADMIN — ACETAMINOPHEN 650 MG: 650 SUPPOSITORY RECTAL at 10:12

## 2019-01-01 RX ADMIN — NOREPINEPHRINE BITARTRATE 16 MCG/MIN: 1 INJECTION INTRAVENOUS at 06:51

## 2019-01-01 RX ADMIN — SODIUM CHLORIDE, PRESERVATIVE FREE 300 UNITS: 5 INJECTION INTRAVENOUS at 21:58

## 2019-01-01 RX ADMIN — Medication 10 ML: at 05:03

## 2019-01-01 RX ADMIN — Medication 10 ML: at 05:29

## 2019-01-01 RX ADMIN — HEPARIN SODIUM 5000 UNITS: 5000 INJECTION INTRAVENOUS; SUBCUTANEOUS at 05:02

## 2019-01-01 RX ADMIN — HYDROMORPHONE HYDROCHLORIDE 0.5 MG: 1 INJECTION, SOLUTION INTRAMUSCULAR; INTRAVENOUS; SUBCUTANEOUS at 21:59

## 2019-01-01 RX ADMIN — ASPIRIN 81 MG 81 MG: 81 TABLET ORAL at 08:20

## 2019-01-01 RX ADMIN — VASOPRESSIN 0.01 UNITS/MIN: 20 INJECTION INTRAVENOUS at 22:56

## 2019-01-01 RX ADMIN — LORAZEPAM 0.5 MG: 2 INJECTION INTRAMUSCULAR at 22:24

## 2019-01-01 RX ADMIN — HYDROMORPHONE HYDROCHLORIDE 0.5 MG: 1 INJECTION, SOLUTION INTRAMUSCULAR; INTRAVENOUS; SUBCUTANEOUS at 22:57

## 2019-01-01 RX ADMIN — LEVOTHYROXINE SODIUM 75 MCG: 75 TABLET ORAL at 07:23

## 2019-01-01 RX ADMIN — NOREPINEPHRINE BITARTRATE 4 MCG/MIN: 1 INJECTION INTRAVENOUS at 02:20

## 2019-01-01 RX ADMIN — SODIUM CHLORIDE, PRESERVATIVE FREE 300 UNITS: 5 INJECTION INTRAVENOUS at 08:36

## 2019-01-01 RX ADMIN — Medication 40 ML: at 14:27

## 2019-01-01 RX ADMIN — SODIUM CHLORIDE 1000 ML: 900 INJECTION, SOLUTION INTRAVENOUS at 19:32

## 2019-01-01 RX ADMIN — NOREPINEPHRINE BITARTRATE 16 MCG/MIN: 1 INJECTION INTRAVENOUS at 15:49

## 2019-01-01 RX ADMIN — SODIUM CHLORIDE, PRESERVATIVE FREE 300 UNITS: 5 INJECTION INTRAVENOUS at 10:05

## 2019-01-01 RX ADMIN — CEFEPIME HYDROCHLORIDE 1 G: 1 INJECTION, POWDER, FOR SOLUTION INTRAMUSCULAR; INTRAVENOUS at 05:29

## 2019-01-01 RX ADMIN — HEPARIN SODIUM 5000 UNITS: 5000 INJECTION INTRAVENOUS; SUBCUTANEOUS at 22:13

## 2019-01-01 RX ADMIN — MORPHINE SULFATE 2 MG: 2 INJECTION, SOLUTION INTRAMUSCULAR; INTRAVENOUS at 22:35

## 2019-01-01 RX ADMIN — NOREPINEPHRINE BITARTRATE 30 MCG/MIN: 1 INJECTION INTRAVENOUS at 01:32

## 2019-01-01 RX ADMIN — SODIUM BICARBONATE 50 MEQ: 84 INJECTION, SOLUTION INTRAVENOUS at 13:09

## 2019-01-01 RX ADMIN — ACETAMINOPHEN 650 MG: 650 SUPPOSITORY RECTAL at 15:55

## 2019-01-01 RX ADMIN — HEPARIN SODIUM 5000 UNITS: 5000 INJECTION INTRAVENOUS; SUBCUTANEOUS at 13:49

## 2019-01-01 RX ADMIN — HEPARIN SODIUM 5000 UNITS: 5000 INJECTION INTRAVENOUS; SUBCUTANEOUS at 14:27

## 2019-01-01 RX ADMIN — HYDROMORPHONE HYDROCHLORIDE 0.5 MG: 1 INJECTION, SOLUTION INTRAMUSCULAR; INTRAVENOUS; SUBCUTANEOUS at 08:36

## 2019-01-01 RX ADMIN — SODIUM CHLORIDE, PRESERVATIVE FREE 10 ML: 5 INJECTION INTRAVENOUS at 21:58

## 2019-01-01 RX ADMIN — SODIUM CHLORIDE 50 ML/HR: 900 INJECTION, SOLUTION INTRAVENOUS at 04:59

## 2019-01-01 RX ADMIN — HYDROMORPHONE HYDROCHLORIDE 0.5 MG: 1 INJECTION, SOLUTION INTRAMUSCULAR; INTRAVENOUS; SUBCUTANEOUS at 10:05

## 2019-01-01 RX ADMIN — SODIUM BICARBONATE: 84 INJECTION, SOLUTION INTRAVENOUS at 23:21

## 2019-01-01 RX ADMIN — HYDROMORPHONE HYDROCHLORIDE 0.5 MG: 1 INJECTION, SOLUTION INTRAMUSCULAR; INTRAVENOUS; SUBCUTANEOUS at 23:49

## 2019-01-01 RX ADMIN — CEFEPIME HYDROCHLORIDE 2 G: 2 INJECTION, POWDER, FOR SOLUTION INTRAVENOUS at 04:52

## 2019-01-01 RX ADMIN — ONDANSETRON 4 MG: 2 INJECTION INTRAMUSCULAR; INTRAVENOUS at 03:45

## 2019-01-01 RX ADMIN — NOREPINEPHRINE BITARTRATE 26 MCG/MIN: 1 INJECTION INTRAVENOUS at 21:38

## 2019-01-01 RX ADMIN — HALOPERIDOL LACTATE 2 MG: 5 INJECTION, SOLUTION INTRAMUSCULAR at 05:13

## 2019-01-01 RX ADMIN — VASOPRESSIN 0.03 UNITS/MIN: 20 INJECTION INTRAVENOUS at 04:38

## 2019-01-01 RX ADMIN — CEFEPIME HYDROCHLORIDE 2 G: 2 INJECTION, POWDER, FOR SOLUTION INTRAVENOUS at 03:07

## 2019-01-01 RX ADMIN — SODIUM CHLORIDE 50 ML/HR: 900 INJECTION, SOLUTION INTRAVENOUS at 13:10

## 2019-01-01 RX ADMIN — LORAZEPAM 0.5 MG: 2 INJECTION INTRAMUSCULAR at 23:18

## 2019-01-01 RX ADMIN — HYDROCORTISONE SODIUM SUCCINATE 50 MG: 100 INJECTION, POWDER, FOR SOLUTION INTRAMUSCULAR; INTRAVENOUS at 11:58

## 2019-01-01 RX ADMIN — Medication 10 ML: at 05:47

## 2019-01-01 RX ADMIN — SODIUM BICARBONATE: 84 INJECTION, SOLUTION INTRAVENOUS at 13:35

## 2019-01-01 RX ADMIN — MORPHINE SULFATE 2 MG: 2 INJECTION, SOLUTION INTRAMUSCULAR; INTRAVENOUS at 14:45

## 2019-01-01 RX ADMIN — MORPHINE SULFATE 2 MG: 2 INJECTION, SOLUTION INTRAMUSCULAR; INTRAVENOUS at 12:12

## 2019-01-01 RX ADMIN — LORAZEPAM 2 MG: 2 INJECTION INTRAMUSCULAR at 16:34

## 2019-01-01 RX ADMIN — SODIUM CHLORIDE, PRESERVATIVE FREE 300 UNITS: 5 INJECTION INTRAVENOUS at 20:26

## 2019-01-01 RX ADMIN — MORPHINE SULFATE 2 MG: 2 INJECTION, SOLUTION INTRAMUSCULAR; INTRAVENOUS at 03:45

## 2019-01-01 RX ADMIN — HYDROCORTISONE SODIUM SUCCINATE 50 MG: 100 INJECTION, POWDER, FOR SOLUTION INTRAMUSCULAR; INTRAVENOUS at 17:59

## 2019-01-01 RX ADMIN — SODIUM CHLORIDE 150 ML/HR: 900 INJECTION, SOLUTION INTRAVENOUS at 00:58

## 2019-01-01 RX ADMIN — SODIUM CHLORIDE, PRESERVATIVE FREE 300 UNITS: 5 INJECTION INTRAVENOUS at 20:34

## 2019-01-01 RX ADMIN — HYDROCORTISONE SODIUM SUCCINATE 50 MG: 100 INJECTION, POWDER, FOR SOLUTION INTRAMUSCULAR; INTRAVENOUS at 23:29

## 2019-01-01 RX ADMIN — ACETAMINOPHEN 650 MG: 325 TABLET, FILM COATED ORAL at 07:24

## 2019-01-01 RX ADMIN — Medication 100 MCG/MIN: at 03:46

## 2019-01-01 RX ADMIN — MORPHINE SULFATE 2 MG: 2 INJECTION, SOLUTION INTRAMUSCULAR; INTRAVENOUS at 12:40

## 2019-01-01 RX ADMIN — SODIUM BICARBONATE: 84 INJECTION, SOLUTION INTRAVENOUS at 04:10

## 2019-01-01 RX ADMIN — SODIUM CHLORIDE, PRESERVATIVE FREE 10 ML: 5 INJECTION INTRAVENOUS at 08:36

## 2019-01-01 RX ADMIN — CEFEPIME HYDROCHLORIDE 2 G: 2 INJECTION, POWDER, FOR SOLUTION INTRAVENOUS at 03:29

## 2019-01-01 RX ADMIN — MORPHINE SULFATE 2 MG: 2 INJECTION, SOLUTION INTRAMUSCULAR; INTRAVENOUS at 19:40

## 2019-01-01 RX ADMIN — HYDROMORPHONE HYDROCHLORIDE 0.5 MG: 1 INJECTION, SOLUTION INTRAMUSCULAR; INTRAVENOUS; SUBCUTANEOUS at 04:24

## 2019-01-01 RX ADMIN — LORAZEPAM 0.5 MG: 2 INJECTION INTRAMUSCULAR at 22:35

## 2019-01-01 RX ADMIN — SODIUM CHLORIDE, PRESERVATIVE FREE 10 ML: 5 INJECTION INTRAVENOUS at 20:34

## 2019-01-01 RX ADMIN — VANCOMYCIN HYDROCHLORIDE 1000 MG: 1 INJECTION, POWDER, LYOPHILIZED, FOR SOLUTION INTRAVENOUS at 11:38

## 2019-01-01 RX ADMIN — SODIUM CHLORIDE 50 ML/HR: 900 INJECTION, SOLUTION INTRAVENOUS at 09:43

## 2019-01-01 RX ADMIN — Medication 500 MG: at 08:20

## 2019-01-01 RX ADMIN — VASOPRESSIN 0.03 UNITS/MIN: 20 INJECTION INTRAVENOUS at 21:02

## 2019-01-01 RX ADMIN — SODIUM CHLORIDE, PRESERVATIVE FREE 3 ML: 5 INJECTION INTRAVENOUS at 16:01

## 2019-01-01 RX ADMIN — Medication 10 ML: at 21:18

## 2019-01-01 RX ADMIN — HYDROCORTISONE SODIUM SUCCINATE 50 MG: 100 INJECTION, POWDER, FOR SOLUTION INTRAMUSCULAR; INTRAVENOUS at 05:29

## 2019-01-01 RX ADMIN — MORPHINE SULFATE 2 MG: 2 INJECTION, SOLUTION INTRAMUSCULAR; INTRAVENOUS at 09:25

## 2019-01-01 RX ADMIN — Medication 10 ML: at 21:10

## 2019-01-01 RX ADMIN — MORPHINE SULFATE 2 MG: 2 INJECTION, SOLUTION INTRAMUSCULAR; INTRAVENOUS at 09:46

## 2019-01-01 RX ADMIN — CYANOCOBALAMIN TAB 1000 MCG 1000 MCG: 1000 TAB at 08:20

## 2019-01-01 RX ADMIN — MORPHINE SULFATE 1 MG: 2 INJECTION, SOLUTION INTRAMUSCULAR; INTRAVENOUS at 09:59

## 2019-01-01 RX ADMIN — SODIUM CHLORIDE 60 MCG/MIN: 900 INJECTION, SOLUTION INTRAVENOUS at 03:58

## 2019-01-01 RX ADMIN — Medication 10 ML: at 22:13

## 2019-01-01 RX ADMIN — HYDROCORTISONE SODIUM SUCCINATE 50 MG: 100 INJECTION, POWDER, FOR SOLUTION INTRAMUSCULAR; INTRAVENOUS at 13:10

## 2019-01-01 RX ADMIN — HEPARIN SODIUM 5000 UNITS: 5000 INJECTION INTRAVENOUS; SUBCUTANEOUS at 21:09

## 2019-01-01 RX ADMIN — NOREPINEPHRINE BITARTRATE 10 MCG/MIN: 1 INJECTION INTRAVENOUS at 20:00

## 2019-01-01 RX ADMIN — ACETAMINOPHEN 650 MG: 650 SUPPOSITORY RECTAL at 15:53

## 2019-09-12 PROBLEM — C25.8 OVERLAPPING MALIGNANT NEOPLASM OF PANCREAS (HCC): Status: ACTIVE | Noted: 2019-01-01

## 2019-09-12 PROBLEM — A41.9 SEPSIS (HCC): Status: ACTIVE | Noted: 2019-01-01

## 2019-09-12 PROBLEM — J18.9 PNEUMONIA: Status: ACTIVE | Noted: 2019-01-01

## 2019-09-12 NOTE — ED TRIAGE NOTES
Pt arrives via EMS from home, AMS, diarrhea and low grade temperature. Hx of pancreatic cancer for which she is on hospice for. Sepsis protocol started with EMS. Given zosyn and a liter of fluids. Pt has been increasingly weak as well. Diarrhea for 4 days.  Started on abx yesterday, unsure for what

## 2019-09-13 NOTE — PROGRESS NOTES
Patient currently at max rate on Levophed. MD [facility] paged and orders to start Vasopressin.

## 2019-09-13 NOTE — PROGRESS NOTES
Patient restless, anxious, pulling off gown and monitoring equipment. Orders received from Dr. Tawanna Dunbar for 2 mg ativan IVP once.

## 2019-09-13 NOTE — H&P
HOSPITALIST H&P/CONSULT  NAME:  Jesse Mae   Age:  66 y.o.  :   1945   MRN:   [id number]  PCP: Jackie Keyes MD  Consulting MD:  Treatment Team: Attending Provider: Danette Vela MD; Primary Nurse: Regla Pedroza, RN; Primary Nurse: Chad Menchaca RN  HPI:   Patient is a 89SOU with pancreatic cancer on home hospice who presents to the ER for confusion and weakness. She has been having diarrhea for about 1 week. Patient was reportedly started on an antibiotic yesterday, but patient is unsure what it was and why it was started. Patient was brought into ER by EMS. I confirmed with the patient that she is in home hospice, but she would like to be admitted to the hospital for IVFs and antibiotics given finding of sepsis likely 2/2 pneumonia. Patient endorses generalized malaise, weakness, nausea, fevers. Complete ROS done and is as stated in HPI or otherwise negative. Past Medical History:   Diagnosis Date    Arthritis     osteo    Chronic pain     left knee    GERD (gastroesophageal reflux disease)     nexium    Hypercholesteremia     Hypertension     on 2 meds    Left knee pain     >5 months    PAD (peripheral artery disease) (HCC)     awaiting a Left carotid endarterctomy    TIA (transient ischemic attack) 2015    Past Medical History reviewed. Past Surgical History:   Procedure Laterality Date    HX CHOLECYSTECTOMY      HX COLONOSCOPY      HX OPEN CHOLECYSTECTOMY  age 27    HX ORTHOPAEDIC Left 2014    knee scope    [place] BIOPSY OF BREAST, NEEDLE CORE Right     left breast bx    VASCULAR SURGERY PROCEDURE UNLIST Left     carotid endarterectomy       Prior to Admission Medications   Prescriptions Last Dose Informant Patient Reported? Taking? BACILLUS COAGULANS (DIGESTIVE ADVANTAGE PO)   Yes No   Sig: Take  by mouth. Biotin 2,500 mcg cap   Yes No   Sig: Take  by mouth daily.    CALCIUM CARBONATE/VITAMIN D3 (CALCIUM 600 + D PO)   Yes No Sig: take 1 Tab by mouth daily. CRANBERRY FRUIT EXTRACT (CRANBERRY CONCENTRATE PO)   Yes No   Sig: Take  by mouth. LEVOTHYROXINE SODIUM (LEVOTHYROXINE PO)   Yes No   Sig: Take  by mouth. aspirin 81 mg chewable tablet   No No   Sig: Take 1 tablet by mouth daily. atorvastatin (LIPITOR) 80 mg tablet   Yes No   Sig: Take 80 mg by mouth daily. calcium carbonate (OS-ESTHELA) 500 mg calcium (1,250 mg) tablet   Yes No   Sig: Take  by mouth daily. carvedilol (COREG) 6.25 mg tablet   No No   Sig: Take 1 tablet by mouth two (2) times daily (with meals). cyanocobalamin 1,000 mcg tablet   Yes No   Sig: Take 1,000 mcg by mouth daily. ergocalciferol (VITAMIN D2) 50,000 unit capsule   Yes No   Sig: Take 50,000 Units by mouth. 1 twice weekly   esomeprazole (NEXIUM) 20 mg capsule   Yes No   Sig: Take 20 mg by mouth every morning. Indications: GASTROESOPHAGEAL REFLUX, HEARTBURN   magnesium 250 mg tab   Yes No   Sig: Take  by mouth. Indications: HYPOMAGNESEMIA   meloxicam (MOBIC) 7.5 mg tablet   No No   Sig: Take 1 Tab by mouth daily. rosuvastatin (CRESTOR) 20 mg tablet   Yes No   Sig: Take 20 mg by mouth nightly. tiZANidine (ZANAFLEX) 4 mg tablet   No No   Sig: Take 1 Tab by mouth nightly. tolterodine ER (DETROL LA) 4 mg ER capsule   Yes No   Sig: Take 4 mg by mouth daily. valsartan (DIOVAN) 160 mg tablet   Yes No   Sig: Take  by mouth daily. Facility-Administered Medications: None     Allergies   Allergen Reactions    Penicillins Unknown (comments)    Sulfa (Sulfonamide Antibiotics) Hives      Social History     Tobacco Use    Smoking status: Never Smoker    Smokeless tobacco: Never Used   Substance Use Topics    Alcohol use: Yes     Alcohol/week: 5.8 standard drinks     Types: 7 Glasses of wine per week      Family History   Problem Relation Age of Onset    Hypertension Mother     Family History reviewed and is non-contributory to current presentation.   Objective:     Visit Vitals  BP 90/54   Pulse 95   Temp 98 °F (36.7 °C)   Resp 24   Wt 64.4 kg (142 lb)   SpO2 93%   BMI 26.40 kg/m²      Temp (24hrs), Av.4 °F (36.9 °C), Min:98 °F (36.7 °C), Max:98.8 °F (37.1 °C)    Oxygen Therapy  O2 Sat (%): 93 % (19)  Pulse via Oximetry: 142 beats per minute (19)  O2 Device: Nasal cannula (19)  O2 Flow Rate (L/min): 2 l/min (19)  Physical Exam:  General:    Cooperative, appears ill, appears stated age. Head:   Normocephalic, without obvious abnormality, atraumatic. Nose:  Nares normal. No drainage or sinus tenderness. Lungs:   Diminished bilaterally, mildly tachypneic  Heart:   Tachycardic, no murmur, rub or gallop. Abdomen:   Soft, non-tender. Not distended. Bowel sounds normal.   Extremities: No cyanosis. No edema. No clubbing. Skin:     Texture, turgor normal.  Not Jaundiced. Neurologic: No focal deficits. Data Review:   Recent Results (from the past 24 hour(s))   CBC WITH AUTOMATED DIFF    Collection Time: 19  7:07 PM   Result Value Ref Range    WBC 2.8 (L) 4.3 - 11.1 K/uL    RBC 3.66 (L) 4.05 - 5.2 M/uL    HGB 10.2 (L) 11.7 - 15.4 g/dL    HCT 33.2 (L) 35.8 - 46.3 %    MCV 90.7 79.6 - 97.8 FL    MCH 27.9 26.1 - 32.9 PG    MCHC 30.7 (L) 31.4 - 35.0 g/dL    RDW 13.7 11.9 - 14.6 %    PLATELET 433 175 - 429 K/uL    MPV 9.0 (L) 9.4 - 12.3 FL    ABSOLUTE NRBC 0.02 0.0 - 0.2 K/uL    NEUTROPHILS 28 (L) 43 - 78 %    LYMPHOCYTES 26 13 - 44 %    MONOCYTES 1 (L) 4.0 - 12.0 %    EOSINOPHILS 40 (H) 0.5 - 7.8 %    BASOPHILS 0 0.0 - 2.0 %    IMMATURE GRANULOCYTES 5 0.0 - 5.0 %    ABS. NEUTROPHILS 0.8 (L) 1.7 - 8.2 K/UL    ABS. LYMPHOCYTES 0.7 0.5 - 4.6 K/UL    ABS. MONOCYTES 0.0 (L) 0.1 - 1.3 K/UL    ABS. EOSINOPHILS 1.2 (H) 0.0 - 0.8 K/UL    ABS. BASOPHILS 0.0 0.0 - 0.2 K/UL    ABS. IMM.  GRANS. 0.1 0.0 - 0.5 K/UL    RBC COMMENTS NORMOCYTIC/NORMOCHROMIC      PLATELET COMMENTS ADEQUATE      DF AUTOMATED     METABOLIC PANEL, COMPREHENSIVE    Collection Time: 19 7:07 PM   Result Value Ref Range    Sodium 137 136 - 145 mmol/L    Potassium 3.1 (L) 3.5 - 5.1 mmol/L    Chloride 102 98 - 107 mmol/L    CO2 14 (L) 21 - 32 mmol/L    Anion gap 21 (H) 7 - 16 mmol/L    Glucose 72 65 - 100 mg/dL    BUN 18 8 - 23 MG/DL    Creatinine 2.20 (H) 0.6 - 1.0 MG/DL    GFR est AA 28 (L) >60 ml/min/1.73m2    GFR est non-AA 23 (L) >60 ml/min/1.73m2    Calcium 8.5 8.3 - 10.4 MG/DL    Bilirubin, total 1.4 (H) 0.2 - 1.1 MG/DL    ALT (SGPT) 48 12 - 65 U/L    AST (SGOT) 109 (H) 15 - 37 U/L    Alk. phosphatase 228 (H) 50 - 136 U/L    Protein, total 5.6 (L) 6.3 - 8.2 g/dL    Albumin 2.5 (L) 3.2 - 4.6 g/dL    Globulin 3.1 2.3 - 3.5 g/dL    A-G Ratio 0.8 (L) 1.2 - 3.5     PROCALCITONIN    Collection Time: 09/12/19  7:07 PM   Result Value Ref Range    Procalcitonin 31.5 ng/mL   POC LACTIC ACID    Collection Time: 09/12/19  7:12 PM   Result Value Ref Range    Lactic Acid (POC) 12.11 (H) 0.5 - 1.9 mmol/L   EKG, 12 LEAD, INITIAL    Collection Time: 09/12/19  7:15 PM   Result Value Ref Range    Ventricular Rate 137 BPM    Atrial Rate 138 BPM    P-R Interval 132 ms    QRS Duration 76 ms    Q-T Interval 364 ms    QTC Calculation (Bezet) 549 ms    Calculated P Axis 112 degrees    Calculated R Axis 84 degrees    Calculated T Axis 63 degrees    Diagnosis       !! AGE AND GENDER SPECIFIC ECG ANALYSIS !!   Sinus tachycardia with Premature atrial complexes  Cannot rule out Anterior infarct , age undetermined  Abnormal ECG  When compared with ECG of 07-JAN-2015 15:09,  Premature atrial complexes are now Present  Confirmed by Oni Fischer MD (), Fransisco Page ([id number]) on 9/12/2019 9:00:58 PM     POC G3    Collection Time: 09/12/19  7:42 PM   Result Value Ref Range    Device: NASAL CANNULA      pH (POC) 7.309 (L) 7.35 - 7.45      pCO2 (POC) 18.9 (LL) 35 - 45 MMHG    pO2 (POC) 78 75 - 100 MMHG    HCO3 (POC) 9.5 (L) 22 - 26 MMOL/L    sO2 (POC) 95 95 - 98 %    Base deficit (POC) 15 mmol/L    Allens test (POC) YES      Site LEFT RADIAL Patient temp. 98.6      Specimen type (POC) ARTERIAL      Performed by Alden     CO2, POC 10 MMOL/L    Flow rate (POC) 2.000 L/min    Respiratory comment: PhysicianNotified     COLLECT TIME 1,940     URINALYSIS W/ RFLX MICROSCOPIC    Collection Time: 09/12/19  9:30 PM   Result Value Ref Range    Color YELLOW      Appearance CLOUDY      Specific gravity 1.010 1.001 - 1.023      pH (UA) 6.0 5.0 - 9.0      Protein 30 (A) NEG mg/dL    Glucose NEGATIVE  mg/dL    Ketone NEGATIVE  NEG mg/dL    Bilirubin NEGATIVE  NEG      Blood TRACE (A) NEG      Urobilinogen 0.2 0.2 - 1.0 EU/dL    Nitrites NEGATIVE  NEG      Leukocyte Esterase NEGATIVE  NEG      WBC 3-5 0 /hpf    RBC 0-3 0 /hpf    Epithelial cells 0-3 0 /hpf    Bacteria 0 0 /hpf    Casts 0-3 0 /lpf   POC LACTIC ACID    Collection Time: 09/13/19 12:32 AM   Result Value Ref Range    Lactic Acid (POC) 5.58 (H) 0.5 - 1.9 mmol/L     Imaging /Procedures /Studies     Assessment and Plan: Active Hospital Problems    Diagnosis Date Noted    Pneumonia 09/12/2019    Sepsis ([facility] [address] 09/12/2019    Overlapping malignant neoplasm of pancreas ([facility] [address] 03/11/2019    HTN (hypertension) 01/07/2015       PLAN  Sepsis/Pneumonia  - Patient appears ill  - Lactic acid is elevated  - Will start vanc/cefipime  - IVFs    Pancreatic Cancer  - Will consult with Case Management as patient is on Home Hospice and would like to continue to be with Hospice, but does wish to be admitted to the hospital for further treatment including IVFs and antibiotics  - Pt remains DNR    HTN  - Holding BP meds given sepsis and low BP       Anticipated discharge: Unclear. Patient is severely ill. **Addendum**  Pt became hypotensive again after my evaluation in the ER. Admitting pt to ICU. Pt is clear that while she wished to remain DNR, she is okay with starting pressors if needed. Does not wish to have chest compressions, shocks, or intubation.     Have started levophed and now adding vasopressin given refractory hypotension. Appreciate Dr. Jazmyn Gann evaluation and intervention with line placement in ICU.     Signed By: Ryne Richards MD     September 13, 2019

## 2019-09-13 NOTE — ED PROVIDER NOTES
78-year-old white female history of stage IV pancreatic cancer brought in by  due to confusion. He reports that she was fine last night before going to bed but this morning did not get out of bed as she usually does. She has had diarrhea for approximately a week. She has had chills throughout the day today.  reports she is on antibiotic but he cannot recall the name or what she is being treated for. Patient is in some respiratory distress and cannot contribute significantly to history of present illness. The history is provided by the spouse and the patient. Altered mental status    Associated symptoms include confusion.         Past Medical History:   Diagnosis Date    Arthritis     osteo    Chronic pain     left knee    GERD (gastroesophageal reflux disease)     nexium    Hypercholesteremia     Hypertension     on 2 meds    Left knee pain     >5 months    PAD (peripheral artery disease) (HCC)     awaiting a Left carotid endarterctomy    TIA (transient ischemic attack) 1/7/2015       Past Surgical History:   Procedure Laterality Date    HX CHOLECYSTECTOMY      HX COLONOSCOPY      HX OPEN CHOLECYSTECTOMY  age 27    HX ORTHOPAEDIC Left 9/2014    knee scope    [place] BIOPSY OF BREAST, NEEDLE CORE Right 2009    left breast bx    VASCULAR SURGERY PROCEDURE UNLIST Left     carotid endarterectomy          Family History:   Problem Relation Age of Onset    Hypertension Mother        Social History     Socioeconomic History    Marital status:      Spouse name: Not on file    Number of children: Not on file    Years of education: Not on file    Highest education level: Not on file   Occupational History    Not on file   Social Needs    Financial resource strain: Not on file    Food insecurity:     Worry: Not on file     Inability: Not on file    Transportation needs:     Medical: Not on file     Non-medical: Not on file   Tobacco Use    Smoking status: Never Smoker    Smokeless tobacco: Never Used   Substance and Sexual Activity    Alcohol use: Yes     Alcohol/week: 5.8 standard drinks     Types: 7 Glasses of wine per week    Drug use: No    Sexual activity: Not on file   Lifestyle    Physical activity:     Days per week: Not on file     Minutes per session: Not on file    Stress: Not on file   Relationships    Social connections:     Talks on phone: Not on file     Gets together: Not on file     Attends [employer] service: Not on file     Active member of club or organization: Not on file     Attends meetings of clubs or organizations: Not on file     Relationship status: Not on file    Intimate partner violence:     Fear of current or ex partner: Not on file     Emotionally abused: Not on file     Physically abused: Not on file     Forced sexual activity: Not on file   Other Topics Concern    Not on file   Social History Narrative    Not on file         ALLERGIES: Penicillins and Sulfa (sulfonamide antibiotics)    Review of Systems   Constitutional: Positive for chills. Negative for fever. HENT: Negative for congestion. Respiratory: Positive for shortness of breath. Negative for cough. Gastrointestinal: Positive for diarrhea. Negative for abdominal pain and vomiting. Genitourinary: Negative for dysuria. Musculoskeletal: Negative for back pain. Skin: Negative for rash. Neurological: Negative for headaches. Psychiatric/Behavioral: Positive for confusion. Vitals:    09/12/19 1900 09/12/19 1914 09/12/19 1929   BP: (!) 69/34 (!) 86/48 125/54   Pulse: (!) 137 (!) 138 (!) 138   Resp: 26     Temp: 98.8 °F (37.1 °C)     SpO2: 96%  (!) 89%   Weight: 64.4 kg (142 lb)              Physical Exam   Constitutional: She appears well-developed and well-nourished. She appears distressed. HENT:   Head: Normocephalic and atraumatic.    Mouth/Throat: Oropharynx is clear and moist.   Eyes: Conjunctivae are normal.   Pupils are dilated and minimally reactive   Neck: Normal range of motion. Cardiovascular: Regular rhythm. Tachycardia present. Pulmonary/Chest:   Tachypneic with shallow respirations   Abdominal: Soft. She exhibits no distension. There is no tenderness. Musculoskeletal: She exhibits no edema. Neurological:   Awake follows some simple commands. Does move all extremities. Skin: Skin is warm and dry. Psychiatric: She has a normal mood and affect. Her behavior is normal.   Nursing note and vitals reviewed. MDM  Number of Diagnoses or Management Options  Diagnosis management comments: Patient has already received IV fluids and antibiotics by EMS. Have continued IV fluids for hypotension and tachycardia for possible sepsis. ABG shows pH 7.3 PCO2 18 PO2 78. Lactic acid 12.1, CBC white blood count 2.8 hemoglobin 10.2 hematocrit 33.2. Recent panel shows acute kidney injury with creatinine 2.2. Chest x-ray possible infiltrate left base.        Amount and/or Complexity of Data Reviewed  Clinical lab tests: ordered and reviewed  Tests in the radiology section of CPT®: ordered and reviewed  Tests in the medicine section of CPT®: ordered and reviewed  Discuss the patient with other providers: yes  Independent visualization of images, tracings, or specimens: yes    Risk of Complications, Morbidity, and/or Mortality  Presenting problems: high  Diagnostic procedures: high  Management options: high           Procedures

## 2019-09-13 NOTE — CONSULTS
Palliative Care    Patient: Melissa Torres MRN: [id number]  SSN: [id number]    YOB: 1945  Age: 66 y.o. Sex: female       Date of Request: 9/13/19  Date of Consult:  9/13/2019  Reason for Consult:  goals of care  Requesting Physician: Dr. Myrna Espinal     Assessment/Plan:     Principal Diagnosis:    Failure to Thrive  R62.7    Additional Diagnoses:   · Advance Care Planning Counseling Z71.89  · Diarrhea  R19.7  · Pain, back  M54.9  · Sepsis, Unspecified Organism:  A41.9  · Counseling, Encounter for Medical Advice  Z71.9  · Encounter for Palliative Care  Z51.5    Palliative Performance Scale (PPS):  PPS: 40    Medical Decision Making:   Reviewed and summarized notes from admission to present. Discussed case with appropriate providers: PA Muse  Reviewed laboratory and x-ray data from admission to present. The pt is resting in bed, her  and other family members at bedside. Ms Kenny Rashid endorses pain in her back that started a couple of weeks ago and has increased in the last week. She was not able to give a pain score, but described the pain as \"a lot\". The Norco she received at 0800 did not help, and she has since received morphine IV 2 mg, which she can receive every 4 hours. I have added a lidocaine patch, to be applied to the spine proximal to the pain. The pt expressed her understanding that the pain my be related to metastases. She denies nausea. We discussed the pt's recently voiced wish to stop using hospice services ([facility]) as she has done very well during the last 6 months. However, Ms Kenny Rashid seems to be realizing that this hospitalization may signal a major change in her body's response to the metastasized pancreatic cancer. We discussed that, if Ms Kenny Rashid wishes to return to the hospital to treat infections, low blood pressure, or other conditions that may arise, then home health would be a more appropriate service to use on discharge.   If, however, she were to feel that she did not want to return to the hospital, home hospice would be very appropriate. We agreed that it's too early to make a decision concerning home services, and that the pt and her family would gain a sense of the best choice over the next few days. On Monday we will plan to convert to a long-acting pain med. We will also talk further about hospice vs home health. Will discuss findings with members of the interdisciplinary team.      Thank you for this referral.          .    Subjective:     History obtained from:  Patient, Family, Care Provider and Chart    Chief Complaint: Back pain    History of Present Illness:  Ms Kenny Rashid is a 67 yo F with a PMH of stage IV pancreatic ca with mets to peritoneum and L lobe of liver (diagnosed at [facility] in 2/2019) on home hospice, who was brought from home to the ER on 9/12/19 with c/o diarrhea x 1 week, confusion, weakness, and nausea. Found to be hypotensive. Admitted for sepsis/pneumonia, elevated lactic acid, hypotension. In renal failure. Advance Directive: No       Code Status:  DNR            Health Care Power of : No - Patient does not have a [address]     Past Medical History:   Diagnosis Date    Arthritis     osteo    Chronic pain     left knee    GERD (gastroesophageal reflux disease)     nexium    Hypercholesteremia     Hypertension     on 2 meds    Left knee pain     >5 months    PAD (peripheral artery disease) (HCC)     awaiting a Left carotid endarterctomy    TIA (transient ischemic attack) 1/7/2015      Past Surgical History:   Procedure Laterality Date    HX CHOLECYSTECTOMY      HX COLONOSCOPY      HX OPEN CHOLECYSTECTOMY  age 27    HX ORTHOPAEDIC Left 9/2014    knee scope    [place] BIOPSY OF BREAST, NEEDLE CORE Right 2009    left breast bx    VASCULAR SURGERY PROCEDURE UNLIST Left     carotid endarterectomy      Family History   Problem Relation Age of Onset    Hypertension Mother Social History     Tobacco Use    Smoking status: Never Smoker    Smokeless tobacco: Never Used   Substance Use Topics    Alcohol use: Yes     Alcohol/week: 5.8 standard drinks     Types: 7 Glasses of wine per week     Prior to Admission medications    Medication Sig Start Date End Date Taking? Authorizing Provider   rosuvastatin (CRESTOR) 20 mg tablet Take 20 mg by mouth nightly. Provider, Historical   tolterodine ER (DETROL LA) 4 mg ER capsule Take 4 mg by mouth daily. Provider, Historical   LEVOTHYROXINE SODIUM (LEVOTHYROXINE PO) Take  by mouth. Provider, Historical   calcium carbonate (OS-ESTHELA) 500 mg calcium (1,250 mg) tablet Take  by mouth daily. Provider, Historical   cyanocobalamin 1,000 mcg tablet Take 1,000 mcg by mouth daily. Provider, Historical   CRANBERRY FRUIT EXTRACT (CRANBERRY CONCENTRATE PO) Take  by mouth. Provider, Historical   BACILLUS COAGULANS (DIGESTIVE ADVANTAGE PO) Take  by mouth. Provider, Historical   meloxicam (MOBIC) 7.5 mg tablet Take 1 Tab by mouth daily. 12/4/17   Ayla Carias MD   tiZANidine (ZANAFLEX) 4 mg tablet Take 1 Tab by mouth nightly. 12/4/17   Ayla Carias MD   atorvastatin (LIPITOR) 80 mg tablet Take 80 mg by mouth daily. Provider, Historical   ergocalciferol (VITAMIN D2) 50,000 unit capsule Take 50,000 Units by mouth. 1 twice weekly    Provider, Historical   valsartan (DIOVAN) 160 mg tablet Take  by mouth daily. Provider, Historical   esomeprazole (NEXIUM) 20 mg capsule Take 20 mg by mouth every morning. Indications: GASTROESOPHAGEAL REFLUX, HEARTBURN    Provider, Historical   aspirin 81 mg chewable tablet Take 1 tablet by mouth daily. 1/9/15   Tono Mendoza MD   carvedilol (COREG) 6.25 mg tablet Take 1 tablet by mouth two (2) times daily (with meals). 1/9/15   Tono Mendoza MD   magnesium 250 mg tab Take  by mouth. Indications: HYPOMAGNESEMIA    Provider, Historical   Biotin 2,500 mcg cap Take  by mouth daily. Provider, Historical   CALCIUM CARBONATE/VITAMIN D3 (CALCIUM 600 + D PO) take 1 Tab by mouth daily. Provider, Historical       Allergies   Allergen Reactions    Penicillins Unknown (comments)    Sulfa (Sulfonamide Antibiotics) Hives        Review of Systems:  A comprehensive review of systems was negative except for: Constitutional: Positive for back pain. Gastrointestinal: Positive for diarrhea, now less frequent     Objective:     Visit Vitals  BP 91/54   Pulse (!) 105   Temp 97.6 °F (36.4 °C)   Resp 29   Ht 5' 1.5\" (1.562 m)   Wt 142 lb (64.4 kg)   SpO2 93%   BMI 26.40 kg/m²        Physical Exam:    General:  Cooperative. No acute distress. Eyes:  Conjunctivae/corneas clear    Nose: Nares normal. Septum midline. Neck: Supple, symmetrical, trachea midline, no JVD   Lungs:   Clear to auscultation bilaterally, unlabored   Heart:  Regular rate and rhythm, no murmur    Abdomen:   Soft, non-tender, non-distended   Extremities: Normal, atraumatic, no cyanosis or edema   Skin: Skin color, texture, turgor normal. No rash or lesions.    Neurologic: Nonfocal   Psych: Alert and oriented      Assessment:     Hospital Problems  Date Reviewed: 12/4/2017          Codes Class Noted POA    Pneumonia ICD-10-CM: J18.9  ICD-9-CM: 626  9/12/2019 Unknown        * (Principal) Sepsis ([facility] [address] ICD-10-CM: A41.9  ICD-9-CM: 038.9, 995.91  9/12/2019 Unknown        Overlapping malignant neoplasm of pancreas ([address] ICD-10-CM: C25.8  ICD-9-CM: 157.8  3/11/2019 Yes        HTN (hypertension) ICD-10-CM: I10  ICD-9-CM: 401.9  1/7/2015 Yes              Signed By: India Rodriguez NP     September 13, 2019

## 2019-09-13 NOTE — CDMP QUERY
Pt admitted with  Sepsis/Pt noted to have elevated creatinine.  If possible, please document in the progress notes and d/c summary if you are evaluating and / or treating any of the following:     Acute kidney failure   Acute kidney failure with tubular necrosis    Acute kidney injury   Other type of acute kidney failure    Other cause (please specify)   Unable to determine    Unknown      The medical record reflects the following:    Risk Factors: Sepsis    Clinical Indicators: creatinine 2.20  2.50    Treatment: IVF's, serial labs    Thank you,  Alessia Cisneros RN  Clinical   [phone]

## 2019-09-13 NOTE — PROGRESS NOTES
Patient alert, oriented x4 when prompted with questions but frequently repeating the same questions/forgetful. Temp 94. 1- juanita hugger placed. Tachypneic on 3L NC, bicarb 9.5 on last ABG- bicarb gtt infusing. Levophed and vasopressin infusing. Extremities mottled and cool but pulses present and palpable and cap refill >3 seconds.

## 2019-09-13 NOTE — CDMP QUERY
Pt admitted with Sepsis/Pt noted to have elevated lactic acid levels.  If possible, please document in the progress notes and d/c summary if you are evaluating and / or treating any of the following:     Acidosis (Metabolic/Respiratory/Lactic)   Alkalosis (Metabolic or Respiratory)   Other, please specify   Clinically unable to determine    The medical record reflects the following:    Risk Factors: Sepsis    Clinical Indicators: LA 12.11 5.58  4.1    Treatment: IVF's, serial labs    Thank you,  Nikolay Huynh RN  Clinical   [phone]

## 2019-09-13 NOTE — H&P (VIEW-ONLY)
CONSULT NOTE    Kash Rivera    9/13/2019    Date of Admission:  9/12/2019    The patient's chart is reviewed and the patient is discussed with the staff. Subjective:     Patient is a 66 y.o.  female seen and evaluated at the request of Dr. Jesse Royal. The patient is seen in consultation via [facility] protocol with sepsis of unclear etiology. Complaint of diarrhea for several days, hypotension now on levophed, on Vanc, Cefepime. Has renal failure. Levo currently at 4, but BP only 80/40. Patient is alert and awake and states she has pancreatic cancer and is not pursuing treatment, was on hospice, but considering revoking this because her quality of life is very good and didn't need their services. She has been independent, driving, going to play bridge with friends prior to getting ill the past couple days. She confirms DNR, but if able to get better with fairly conservative therapy would like to. She is okay with blood products, IVF, antibiotics and vasopressors as well as central line. She reports chills, but denies cough, sputum, nausea, redness. She has had some bruising, but no other skin changes. Review of Systems  A comprehensive review of systems was negative except for that written in the HPI. Patient Active Problem List   Diagnosis Code    TIA (transient ischemic attack) G45.9    HTN (hypertension) I10    GERD (gastroesophageal reflux disease) K21.9    Hyponatremia E87.1    Asymptomatic carotid artery stenosis I65.29    Pneumonia J18.9    Overlapping malignant neoplasm of pancreas ([address] C25.8    Sepsis ([facility] [address] A41.9     Prior to Admission Medications   Prescriptions Last Dose Informant Patient Reported? Taking? BACILLUS COAGULANS (DIGESTIVE ADVANTAGE PO)   Yes No   Sig: Take  by mouth. Biotin 2,500 mcg cap   Yes No   Sig: Take  by mouth daily. CALCIUM CARBONATE/VITAMIN D3 (CALCIUM 600 + D PO)   Yes No   Sig: take 1 Tab by mouth daily.    CRANBERRY FRUIT EXTRACT (CRANBERRY CONCENTRATE PO)   Yes No   Sig: Take  by mouth. LEVOTHYROXINE SODIUM (LEVOTHYROXINE PO)   Yes No   Sig: Take  by mouth. aspirin 81 mg chewable tablet   No No   Sig: Take 1 tablet by mouth daily. atorvastatin (LIPITOR) 80 mg tablet   Yes No   Sig: Take 80 mg by mouth daily. calcium carbonate (OS-ESTHELA) 500 mg calcium (1,250 mg) tablet   Yes No   Sig: Take  by mouth daily. carvedilol (COREG) 6.25 mg tablet   No No   Sig: Take 1 tablet by mouth two (2) times daily (with meals). cyanocobalamin 1,000 mcg tablet   Yes No   Sig: Take 1,000 mcg by mouth daily. ergocalciferol (VITAMIN D2) 50,000 unit capsule   Yes No   Sig: Take 50,000 Units by mouth. 1 twice weekly   esomeprazole (NEXIUM) 20 mg capsule   Yes No   Sig: Take 20 mg by mouth every morning. Indications: GASTROESOPHAGEAL REFLUX, HEARTBURN   magnesium 250 mg tab   Yes No   Sig: Take  by mouth. Indications: HYPOMAGNESEMIA   meloxicam (MOBIC) 7.5 mg tablet   No No   Sig: Take 1 Tab by mouth daily. rosuvastatin (CRESTOR) 20 mg tablet   Yes No   Sig: Take 20 mg by mouth nightly. tiZANidine (ZANAFLEX) 4 mg tablet   No No   Sig: Take 1 Tab by mouth nightly. tolterodine ER (DETROL LA) 4 mg ER capsule   Yes No   Sig: Take 4 mg by mouth daily. valsartan (DIOVAN) 160 mg tablet   Yes No   Sig: Take  by mouth daily.       Facility-Administered Medications: None     Past Medical History:   Diagnosis Date    Arthritis     osteo    Chronic pain     left knee    GERD (gastroesophageal reflux disease)     nexium    Hypercholesteremia     Hypertension     on 2 meds    Left knee pain     >5 months    PAD (peripheral artery disease) (HCC)     awaiting a Left carotid endarterctomy    TIA (transient ischemic attack) 1/7/2015     Past Surgical History:   Procedure Laterality Date    HX CHOLECYSTECTOMY      HX COLONOSCOPY      HX OPEN CHOLECYSTECTOMY  age 27    HX ORTHOPAEDIC Left 9/2014    knee scope    TN BIOPSY OF BREAST, NEEDLE CORE Right 2009    left breast bx    VASCULAR SURGERY PROCEDURE UNLIST Left     carotid endarterectomy      Social History     Socioeconomic History    Marital status:      Spouse name: Not on file    Number of children: Not on file    Years of education: Not on file    Highest education level: Not on file   Occupational History    Not on file   Social Needs    Financial resource strain: Not on file    Food insecurity:     Worry: Not on file     Inability: Not on file    Transportation needs:     Medical: Not on file     Non-medical: Not on file   Tobacco Use    Smoking status: Never Smoker    Smokeless tobacco: Never Used   Substance and Sexual Activity    Alcohol use:  Yes     Alcohol/week: 5.8 standard drinks     Types: 7 Glasses of wine per week    Drug use: No    Sexual activity: Not on file   Lifestyle    Physical activity:     Days per week: Not on file     Minutes per session: Not on file    Stress: Not on file   Relationships    Social connections:     Talks on phone: Not on file     Gets together: Not on file     Attends Yazidi service: Not on file     Active member of club or organization: Not on file     Attends meetings of clubs or organizations: Not on file     Relationship status: Not on file    Intimate partner violence:     Fear of current or ex partner: Not on file     Emotionally abused: Not on file     Physically abused: Not on file     Forced sexual activity: Not on file   Other Topics Concern    Not on file   Social History Narrative    Not on file     Family History   Problem Relation Age of Onset    Hypertension Mother      Allergies   Allergen Reactions    Penicillins Unknown (comments)    Sulfa (Sulfonamide Antibiotics) Hives     Current Facility-Administered Medications   Medication Dose Route Frequency    aspirin chewable tablet 81 mg  81 mg Oral DAILY    carvedilol (COREG) tablet 6.25 mg  6.25 mg Oral BID WITH MEALS    rosuvastatin (CRESTOR) tablet 20 mg  20 mg Oral QHS    . PHARMACY TO SUBSTITUTE PER PROTOCOL (Reordered from: tolterodine ER (DETROL LA) 4 mg ER capsule)    Per Protocol    . PHARMACY TO SUBSTITUTE PER PROTOCOL (Reordered from: LEVOTHYROXINE SODIUM (LEVOTHYROXINE PO))    Per Protocol    calcium carbonate (OS-ESTHELA) tablet 500 mg [elemental]  500 mg Oral DAILY    cyanocobalamin tablet 1,000 mcg  1,000 mcg Oral DAILY    meloxicam (MOBIC) tablet 7.5 mg  7.5 mg Oral DAILY    tiZANidine (ZANAFLEX) tablet 4 mg  4 mg Oral QHS    sodium chloride (NS) flush 5-40 mL  5-40 mL IntraVENous Q8H    sodium chloride (NS) flush 5-40 mL  5-40 mL IntraVENous PRN    acetaminophen (TYLENOL) tablet 650 mg  650 mg Oral Q4H PRN    ondansetron (ZOFRAN) injection 4 mg  4 mg IntraVENous Q4H PRN    0.9% sodium chloride infusion  150 mL/hr IntraVENous CONTINUOUS    HYDROcodone-acetaminophen (NORCO) 5-325 mg per tablet 1 Tab  1 Tab Oral Q4H PRN    morphine injection 1 mg  1 mg IntraVENous Q4H PRN    heparin (porcine) injection 5,000 Units  5,000 Units SubCUTAneous Q8H    cefepime (MAXIPIME) 1 g in 0.9% sodium chloride (MBP/ADV) 50 mL  1 g IntraVENous Q8H    NOREPINephrine (LEVOPHED) 4 mg/250 mL (16 mcg/mL) in NS infusion  0-16 mcg/min IntraVENous TITRATE     Objective:     Vitals:    09/13/19 0205 09/13/19 0215 09/13/19 0229 09/13/19 0237   BP:  (!) 62/40 (!) 68/41 (!) 68/41   Pulse:  96 96 95   Resp:  29 25    Temp:       SpO2:  95% 94% 96%   Weight:       Height: 5' 1.5\" (1.562 m)        PHYSICAL EXAM     Constitutional:  the patient is well developed and in no acute distress  EENMT:  Sclera clear, pupils equal, oral mucosa moist  Respiratory: diminished, but clear  Cardiovascular:  RRR without M,G,R  Gastrointestinal: soft and non-tender; with positive bowel sounds. Musculoskeletal: warm without cyanosis. There is no lower extremity edema.   Skin:  no jaundice or rashes, no wounds   Neurologic: no gross neuro deficits     Psychiatric:  alert and oriented x 4    CXR: Mild basilar atelectasis, no infiltrate, no overt pneumonia      Recent Labs     09/12/19 1907   WBC 2.8*   HGB 10.2*   HCT 33.2*        Recent Labs     09/12/19 1907      K 3.1*      GLU 72   CO2 14*   BUN 18   CREA 2.20*   CA 8.5   ALB 2.5*   TBILI 1.4*   ALT 48   SGOT 109*     Recent Labs     09/12/19 1942   PHI 7.309*   PCO2I 18.9*   PO2I 78   HCO3I 9.5*     No results for input(s): LCAD, LAC in the last 72 hours. Assessment:  (Medical Decision Making)     Hospital Problems  Date Reviewed: 12/4/2017          Codes Class Noted POA    Pneumonia ICD-10-CM: J18.9  ICD-9-CM: 734  9/12/2019 Unknown        * (Principal) [facility] ICD-10-CM: A41.9  ICD-9-CM: 038.9, 995.91  9/12/2019 Unknown        Overlapping malignant neoplasm of pancreas ([facility] ICD-10-CM: C25.8  ICD-9-CM: 157.8  3/11/2019 Yes        HTN (hypertension) ICD-10-CM: I10  ICD-9-CM: 401.9  1/7/2015 Yes            67 y/o female with septic shock, only localizing symptom is diarrhea and could be hypovolemic shock, but with PCT of 31 suspect this is more likely septic shock. Plan:  (Medical Decision Making)     --will place CVC for ongoing supportive care and vasopressor need  --change IVF to bicarb gtt with renal failure, acidosis, diarrhea  --agree with antibiotics for now  --cultures pending  --monitor diarrhea, consider Cdiff if persists    DNR    More than 50% of the time documented was spent in face-to-face contact with the patient and in the care of the patient on the floor/unit where the patient is located. Thank you very much for this referral.  We appreciate the opportunity to participate in this patient's care. Will follow along with above stated plan.     Melissa Fiore MD

## 2019-09-13 NOTE — PROCEDURES
Procedure:  Central Line Insertion  CPT - 95384    Indication:  Septic shock    Chlorohexidine Skin Antiseptic: Yes  Hand Hygiene: Yes  Maximal Barrier Precautions: Yes  Optimal Catheter Site Selection: Yes  Sterile Ultrasound Technique: Yes    Location:  right  internal jugular    Time out done and correct patient/location for procedure. Area prepped and sterilized with chlorhexedine. Sterile drapes applied. Patient given local lidocane for anesthesia. Using Seldinger technique quad lumen lumen catheter inserted. Guidewire removed. All ports with blood return and lines flushed. Line sutured in place. Patient tolerated procedure well, no complications.    Estimated Blood loss: 2cc    Sparkle Monsalve MD

## 2019-09-13 NOTE — PROGRESS NOTES
Bedside and Verbal shift change report given to Fabby (oncoming nurse) by Lisa Sender (offgoing nurse). Report included the following information SBAR, Kardex, ED Summary, Procedure Summary, Intake/Output, MAR, Recent Results, Cardiac Rhythm NSR and Alarm Parameters .     Patient awakens to voice  gtts assessed

## 2019-09-13 NOTE — PROGRESS NOTES
Pharmacokinetic Consult to Pharmacist    Melissa Torres is a 66 y.o. female being treated for HAP with vancomycin and cefepime. Height: 5' 1.5\" (156.2 cm)  Weight: 64.4 kg (142 lb)  Lab Results   Component Value Date/Time    BUN 18 09/12/2019 07:07 PM    Creatinine 2.20 (H) 09/12/2019 07:07 PM    WBC 2.8 (L) 09/12/2019 07:07 PM    Procalcitonin 31.5 09/12/2019 07:07 PM    Lactic Acid (POC) 5.58 (H) 09/13/2019 12:32 AM      Estimated Creatinine Clearance: 19 mL/min (A) (based on SCr of 2.2 mg/dL (H)). CULTURES:  9/12 BCx: pending    Day 1 of vancomycin. Goal trough is 15 -20. Vancomycin dose initiated at 1.5g load x1, then 1g q 36hr. Cefepime dose also adjusted for renal function. Will continue to follow patient.       Thank you,  Lanney Litten, PharmD  Clinical Pharmacist  [phone]

## 2019-09-13 NOTE — PROGRESS NOTES
Patient BP suddenly dropped to 50/30. Vasopressin and levo maxed and patient remains hypotensive, will arouse to voice. Orders received from Dr. Albina Nicole to increase levophed to 30 mcg/min and to add neosynephrine. Levophed infusing at 30 mcg/min, vasopressin infusing at 0.03 units/min, and neosynephrine infusing at 30 mcg/min. CVP 14. ABG obtained. Patient's  updated on patient condition.

## 2019-09-13 NOTE — PROGRESS NOTES
Hospitalist Progress Note    2019  Admit Date: 2019  6:58 PM   NAME: Jennifer Garay   :  1945   DOS:              19  MRN:  [id number]   Attending: Rohan Law MD  PCP:  Mitchell Howard MD  Treatment Team: Attending Provider: Bailee Hein MD; Consulting Provider: Ochoa Keith MD; Utilization Review: Michael Maier RN; Care Manager: Alf Durant RN; Consulting Provider: Mendel Charles, NP    DNR     SUBJECTIVE:   As previously documented:  77yoF with pancreatic cancer on home hospice who presents to the ER for confusion and weakness. She has been having diarrhea for about 1 week. In the ED patient was having hypotension required levophed and central line placement. Patient started on IV vanco/cefepime for sepsis of unclear etiology so far.        19    Jennifer Garay reported having some pain around her mid abdomen. Reported is chronic pain from her Ca. 10+ ROS reviewed and negative except for positive in HPI.    Allergies   Allergen Reactions    Penicillins Unknown (comments)    Sulfa (Sulfonamide Antibiotics) Hives     Current Facility-Administered Medications   Medication Dose Route Frequency    aspirin chewable tablet 81 mg  81 mg Oral DAILY    carvedilol (COREG) tablet 6.25 mg  6.25 mg Oral BID WITH MEALS    rosuvastatin (CRESTOR) tablet 20 mg  20 mg Oral QHS    oxybutynin chloride XL (DITROPAN XL) tablet 5 mg  5 mg Oral DAILY    levothyroxine (SYNTHROID) tablet 75 mcg  75 mcg Oral ACB    calcium carbonate (OS-ESTHELA) tablet 500 mg [elemental]  500 mg Oral DAILY    cyanocobalamin tablet 1,000 mcg  1,000 mcg Oral DAILY    meloxicam (MOBIC) tablet 7.5 mg  7.5 mg Oral DAILY    tiZANidine (ZANAFLEX) tablet 4 mg  4 mg Oral QHS    sodium chloride (NS) flush 5-40 mL  5-40 mL IntraVENous Q8H    sodium chloride (NS) flush 5-40 mL  5-40 mL IntraVENous PRN    acetaminophen (TYLENOL) tablet 650 mg  650 mg Oral Q4H PRN    ondansetron [facility]) injection 4 mg  4 mg IntraVENous Q4H PRN    HYDROcodone-acetaminophen (NORCO) 5-325 mg per tablet 1 Tab  1 Tab Oral Q4H PRN    heparin (porcine) injection 5,000 Units  5,000 Units SubCUTAneous Q8H    NOREPINephrine (LEVOPHED) 4 mg/250 mL (16 mcg/mL) in NS infusion  0-16 mcg/min IntraVENous TITRATE    sodium bicarbonate (8.4%) 150 mEq in dextrose 5% 1,000 mL infusion   IntraVENous CONTINUOUS    vasopressin (VASOSTRICT) 20 Units in 0.9% sodium chloride 100 mL infusion  0-0.03 Units/min IntraVENous TITRATE    influenza vaccine 2019-20 (6 mos+)(PF) (FLUARIX/FLULAVAL/FLUZONE QUAD) injection 0.5 mL  0.5 mL IntraMUSCular PRIOR TO DISCHARGE    [START ON 9/14/2019] cefepime (MAXIPIME) 2 g in 0.9% sodium chloride (MBP/ADV) 100 mL  2 g IntraVENous Q24H    Vancomycin Intermittent Dosing per pharmacy   1,000 mg IntraVENous See Admin Instructions    lidocaine 4 % patch 1 Patch  1 Patch TransDERmal Q24H    morphine injection 2 mg  2 mg IntraVENous Q1H PRN    albuterol (PROVENTIL VENTOLIN) nebulizer solution 2.5 mg  2.5 mg Nebulization NOW    albuterol (PROVENTIL VENTOLIN) nebulizer solution 2.5 mg  2.5 mg Nebulization Q4H PRN         There is no immunization history for the selected administration types on file for this patient.   Objective:     Patient Vitals for the past 24 hrs:   Temp Pulse Resp BP SpO2   09/13/19 1344  (!) 103  (!) 78/52 93 %   09/13/19 1343  (!) 104 26  94 %   09/13/19 1333    (!) 75/47    09/13/19 1332  (!) 104 23 (!) 72/52 93 %   09/13/19 1319  (!) 104 26 (!) 87/58    09/13/19 1316     96 %   09/13/19 1314  (!) 106 27 (!) 81/65    09/13/19 1308  (!) 107 15  91 %   09/13/19 1230  (!) 107 26  91 %   09/13/19 1229  (!) 107  105/59    09/13/19 1216  (!) 107 24  92 %   09/13/19 1215 97.4 °F (36.3 °C) (!) 108  113/69 94 %   09/13/19 1213   21     09/13/19 1205  (!) 106 23 94/53 91 %   09/13/19 1145  (!) 105  (!) 123/102 90 %   09/13/19 1143 97.4 °F (36.3 °C)       09/13/19 1142  (!) 105 28  95 %   09/13/19 1132  (!) 104 21  94 %   09/13/19 1129  (!) 103  97/59 93 %   09/13/19 1115 97.9 °F (36.6 °C) (!) 104 (!) 31 97/59 93 %   09/13/19 1059    121/56    09/13/19 1058  (!) 109  121/56 95 %   09/13/19 1056   28     09/13/19 1037  (!) 105 29  93 %   09/13/19 1029    91/54    09/13/19 1027   27     09/13/19 1024  (!) 107   95 %   09/13/19 1014 97.6 °F (36.4 °C) (!) 103 26 102/63 93 %   09/13/19 0958  (!) 101 30 94/58 95 %   09/13/19 0943 97.6 °F (36.4 °C) 100 29 101/80 99 %   09/13/19 0929  (!) 101 (!) 32 92/72 98 %   09/13/19 0914  100  95/62 91 %   09/13/19 0903  99 24  98 %   09/13/19 0859  (!) 101  97/66 97 %   09/13/19 0853   27     09/13/19 0851  (!) 101 (!) 33 98/66    09/13/19 0845  (!) 101 29  95 %   09/13/19 0829    114/63    09/13/19 0814  (!) 101 30 124/69 92 %   09/13/19 0759  (!) 102  112/61    09/13/19 0745 (!) 94.1 °F (34.5 °C) (!) 104 25 118/63 97 %   09/13/19 0714  (!) 103 27 111/65    09/13/19 0659  (!) 102 20 101/59    09/13/19 0618  (!) 105 27 96/69    09/13/19 0559    99/55    09/13/19 0544    95/54    09/13/19 0514  (!) 101 29 100/59    09/13/19 0459  100 28 101/55 96 %   09/13/19 0443  98 19 (!) 80/51 95 %   09/13/19 0429  100 25 (!) 77/46 90 %   09/13/19 0400 97.6 °F (36.4 °C) 96 18 (!) 81/37 94 %   09/13/19 0343  97 15 (!) 78/44 92 %   09/13/19 0310  94  (!) 83/44    09/13/19 0259    (!) 83/44    09/13/19 0245  98 29 (!) 85/46    09/13/19 0237  95  (!) 68/41 96 %   09/13/19 0229  96 25 (!) 68/41 94 %   09/13/19 0215  96 29 (!) 62/40 95 %   09/13/19 0159 97.6 °F (36.4 °C) 94 29 (!) 65/37    09/13/19 0114    (!) 79/41    09/13/19 0059    (!) 81/43    09/13/19 0048 98 °F (36.7 °C) 95 24 90/54 93 %   09/13/19 0045  91 22 (!) 86/49 94 %   09/12/19 2132  (!) 106 26 (!) 163/116 91 %   09/12/19 1929  (!) 138 26 125/54 (!) 89 %   09/12/19 1914  (!) 138 24 (!) 86/48 92 %   199  (!) 133 24 99/46 94 %   190 98.8 °F (37.1 °C) (!) 137 26 (!) 69/34 96 %     Temp (24hrs), Av.4 °F (36.3 °C), Min:94.1 °F (34.5 °C), Max:98.8 °F (37.1 °C)    Oxygen Therapy  O2 Sat (%): 93 % (19 1344)  Pulse via Oximetry: 104 beats per minute (19 1344)  O2 Device: Nasal cannula (19)  O2 Flow Rate (L/min): 4 l/min (19 134)  Oxygen Therapy  O2 Sat (%): 93 % (19 1344)  Pulse via Oximetry: 104 beats per minute (19 1344)  O2 Device: Nasal cannula (19)  O2 Flow Rate (L/min): 4 l/min (19 134)    Physical Exam:  General:         Alert, cooperative, no distress   HEENT:               Nares normal. No drainage  Lungs: No wheezing/rhonchi/rales  Cardiovascular:   RRR. No m/r/g. No pedal edema b/l. Abdomen:       Mid abdomen tenderness no guarding or rebound. (+) BS  Skin:         No rashes or lesions. Not Jaundiced  Neurologic:     No gross focal deficit. Psychiatric:         Good mood. Normal affect. DIAGNOSTIC STUDIES      Data Review:   Recent Results (from the past 24 hour(s))   CBC WITH AUTOMATED DIFF    Collection Time: 19  7:07 PM   Result Value Ref Range    WBC 2.8 (L) 4.3 - 11.1 K/uL    RBC 3.66 (L) 4.05 - 5.2 M/uL    HGB 10.2 (L) 11.7 - 15.4 g/dL    HCT 33.2 (L) 35.8 - 46.3 %    MCV 90.7 79.6 - 97.8 FL    MCH 27.9 26.1 - 32.9 PG    MCHC 30.7 (L) 31.4 - 35.0 g/dL    RDW 13.7 11.9 - 14.6 %    PLATELET 198 935 - 801 K/uL    MPV 9.0 (L) 9.4 - 12.3 FL    ABSOLUTE NRBC 0.02 0.0 - 0.2 K/uL    NEUTROPHILS 28 (L) 43 - 78 %    LYMPHOCYTES 26 13 - 44 %    MONOCYTES 1 (L) 4.0 - 12.0 %    EOSINOPHILS 40 (H) 0.5 - 7.8 %    BASOPHILS 0 0.0 - 2.0 %    IMMATURE GRANULOCYTES 5 0.0 - 5.0 %    ABS. NEUTROPHILS 0.8 (L) 1.7 - 8.2 K/UL    ABS. LYMPHOCYTES 0.7 0.5 - 4.6 K/UL    ABS. MONOCYTES 0.0 (L) 0.1 - 1.3 K/UL    ABS. EOSINOPHILS 1.2 (H) 0.0 - 0.8 K/UL    ABS. BASOPHILS 0.0 0.0 - 0.2 K/UL    ABS. IMM. Jostin Edelson. 0.1 0.0 - 0.5 K/UL    RBC COMMENTS NORMOCYTIC/NORMOCHROMIC      PLATELET COMMENTS ADEQUATE      DF AUTOMATED     METABOLIC PANEL, COMPREHENSIVE    Collection Time: 09/12/19  7:07 PM   Result Value Ref Range    Sodium 137 136 - 145 mmol/L    Potassium 3.1 (L) 3.5 - 5.1 mmol/L    Chloride 102 98 - 107 mmol/L    CO2 14 (L) 21 - 32 mmol/L    Anion gap 21 (H) 7 - 16 mmol/L    Glucose 72 65 - 100 mg/dL    BUN 18 8 - 23 MG/DL    Creatinine 2.20 (H) 0.6 - 1.0 MG/DL    GFR est AA 28 (L) >60 ml/min/1.73m2    GFR est non-AA 23 (L) >60 ml/min/1.73m2    Calcium 8.5 8.3 - 10.4 MG/DL    Bilirubin, total 1.4 (H) 0.2 - 1.1 MG/DL    ALT (SGPT) 48 12 - 65 U/L    AST (SGOT) 109 (H) 15 - 37 U/L    Alk. phosphatase 228 (H) 50 - 136 U/L    Protein, total 5.6 (L) 6.3 - 8.2 g/dL    Albumin 2.5 (L) 3.2 - 4.6 g/dL    Globulin 3.1 2.3 - 3.5 g/dL    A-G Ratio 0.8 (L) 1.2 - 3.5     CULTURE, BLOOD    Collection Time: 09/12/19  7:07 PM   Result Value Ref Range    Special Requests: LEFT  HAND        Culture result: NO GROWTH AFTER 13 HOURS     PROCALCITONIN    Collection Time: 09/12/19  7:07 PM   Result Value Ref Range    Procalcitonin 31.5 ng/mL   POC LACTIC ACID    Collection Time: 09/12/19  7:12 PM   Result Value Ref Range    Lactic Acid (POC) 12.11 (H) 0.5 - 1.9 mmol/L   EKG, 12 LEAD, INITIAL    Collection Time: 09/12/19  7:15 PM   Result Value Ref Range    Ventricular Rate 137 BPM    Atrial Rate 138 BPM    P-R Interval 132 ms    QRS Duration 76 ms    Q-T Interval 364 ms    QTC Calculation (Bezet) 549 ms    Calculated P Axis 112 degrees    Calculated R Axis 84 degrees    Calculated T Axis 63 degrees    Diagnosis       !! AGE AND GENDER SPECIFIC ECG ANALYSIS !!   Sinus tachycardia with Premature atrial complexes  Cannot rule out Anterior infarct , age undetermined  Abnormal ECG  When compared with ECG of 07-JAN-2015 15:09,  Premature atrial complexes are now Present  Confirmed by Yoselyn Alanis MD (), Porter Funk ([id number]) on 9/12/2019 9:00:58 PM     CULTURE, BLOOD    Collection Time: 09/12/19  7:22 PM   Result Value Ref Range    Special Requests: RIGHT  FOREARM        Culture result: NO GROWTH AFTER 13 HOURS     POC G3    Collection Time: 09/12/19  7:42 PM   Result Value Ref Range    Device: NASAL CANNULA      pH (POC) 7.309 (L) 7.35 - 7.45      pCO2 (POC) 18.9 (LL) 35 - 45 MMHG    pO2 (POC) 78 75 - 100 MMHG    HCO3 (POC) 9.5 (L) 22 - 26 MMOL/L    sO2 (POC) 95 95 - 98 %    Base deficit (POC) 15 mmol/L    Allens test (POC) YES      Site LEFT RADIAL      Patient temp.  98.6      Specimen type (POC) ARTERIAL      Performed by [employer]     CO2, POC 10 MMOL/L    Flow rate (POC) 2.000 L/min    Respiratory comment: PhysicianNotified     COLLECT TIME 1,940     URINALYSIS W/ RFLX MICROSCOPIC    Collection Time: 09/12/19  9:30 PM   Result Value Ref Range    Color YELLOW      Appearance CLOUDY      Specific gravity 1.010 1.001 - 1.023      pH (UA) 6.0 5.0 - 9.0      Protein 30 (A) NEG mg/dL    Glucose NEGATIVE  mg/dL    Ketone NEGATIVE  NEG mg/dL    Bilirubin NEGATIVE  NEG      Blood TRACE (A) NEG      Urobilinogen 0.2 0.2 - 1.0 EU/dL    Nitrites NEGATIVE  NEG      Leukocyte Esterase NEGATIVE  NEG      WBC 3-5 0 /hpf    RBC 0-3 0 /hpf    Epithelial cells 0-3 0 /hpf    Bacteria 0 0 /hpf    Casts 0-3 0 /lpf   POC LACTIC ACID    Collection Time: 09/13/19 12:32 AM   Result Value Ref Range    Lactic Acid (POC) 5.58 (H) 0.5 - 1.9 mmol/L   METABOLIC PANEL, BASIC    Collection Time: 09/13/19  3:24 AM   Result Value Ref Range    Sodium 138 136 - 145 mmol/L    Potassium 3.5 3.5 - 5.1 mmol/L    Chloride 104 98 - 107 mmol/L    CO2 19 (L) 21 - 32 mmol/L    Anion gap 15 7 - 16 mmol/L    Glucose 78 65 - 100 mg/dL    BUN 22 8 - 23 MG/DL    Creatinine 2.50 (H) 0.6 - 1.0 MG/DL    GFR est AA 24 (L) >60 ml/min/1.73m2    GFR est non-AA 20 (L) >60 ml/min/1.73m2    Calcium 7.9 (L) 8.3 - 10.4 MG/DL   CBC WITH AUTOMATED DIFF    Collection Time: 09/13/19  3:24 AM   Result Value Ref Range    WBC 28.1 (H) 4.3 - 11.1 K/uL    RBC 3.51 (L) 4.05 - 5.2 M/uL    HGB 9.7 (L) 11.7 - 15.4 g/dL    HCT 30.9 (L) 35.8 - 46.3 %    MCV 88.0 79.6 - 97.8 FL    MCH 27.6 26.1 - 32.9 PG    MCHC 31.4 31.4 - 35.0 g/dL    RDW 13.9 11.9 - 14.6 %    PLATELET 885 307 - 508 K/uL    MPV 10.0 9.4 - 12.3 FL    ABSOLUTE NRBC 0.04 0.0 - 0.2 K/uL    DF AUTOMATED      NEUTROPHILS 89 (H) 43 - 78 %    LYMPHOCYTES 4 (L) 13 - 44 %    MONOCYTES 2 (L) 4.0 - 12.0 %    EOSINOPHILS 1 0.5 - 7.8 %    BASOPHILS 0 0.0 - 2.0 %    IMMATURE GRANULOCYTES 5 0.0 - 5.0 %    ABS. NEUTROPHILS 24.9 (H) 1.7 - 8.2 K/UL    ABS. LYMPHOCYTES 1.2 0.5 - 4.6 K/UL    ABS. MONOCYTES 0.4 0.1 - 1.3 K/UL    ABS. EOSINOPHILS 0.1 0.0 - 0.8 K/UL    ABS. BASOPHILS 0.1 0.0 - 0.2 K/UL    ABS. IMM. GRANS.  1.3 (H) 0.0 - 0.5 K/UL   LACTIC ACID    Collection Time: 09/13/19  3:24 AM   Result Value Ref Range    Lactic acid 4.1 (HH) 0.4 - 2.0 MMOL/L   C. DIFFICILE AG & TOXIN A/B    Collection Time: 09/13/19  5:45 AM   Result Value Ref Range    GDH ANTIGEN C. DIFFICILE GDH ANTIGEN-NEGATIVE      C. difficile toxin C. DIFFICILE TOXIN-NEGATIVE      PCR Reflex NOT APPLICABLE      INTERPRETATION NEGATIVE FOR TOXIGENIC C. DIFFICILE NTXCD      Clinical Consideration NEGATIVE FOR TOXIGENIC C. DIFFICILE     LACTIC ACID    Collection Time: 09/13/19 10:05 AM   Result Value Ref Range    Lactic acid 6.1 (HH) 0.4 - 2.0 MMOL/L       All Micro Results     Procedure Component Value Units Date/Time    C. DIFFICILE AG & TOXIN A/B [[id number]] Collected:  09/13/19 0545    Order Status:  Completed Specimen:  Stool Updated:  09/13/19 1153     [address] ANTIGEN       C. DIFFICILE GDH ANTIGEN-NEGATIVE           C. difficile toxin       C. DIFFICILE TOXIN-NEGATIVE           PCR Reflex NOT APPLICABLE        INTERPRETATION       NEGATIVE FOR TOXIGENIC C. DIFFICILE           Clinical Consideration       NEGATIVE FOR TOXIGENIC C. DIFFICILE          CULTURE, BLOOD [[id number]] Collected:  09/12/19 1907    Order Status:  Completed Specimen:  Blood Updated:  09/13/19 0931     Special Requests: --        LEFT  HAND       Culture result: NO GROWTH AFTER 13 HOURS       CULTURE, BLOOD [[id number]] Collected:  09/12/19 1922    Order Status:  Completed Specimen:  Blood Updated:  09/13/19 0931     Special Requests: --        RIGHT  FOREARM       Culture result: NO GROWTH AFTER 13 HOURS             Imaging Merlene Burt /Studies:    CXR Results  (Last 48 hours)               09/13/19 0400  XR CHEST PORT Final result    Impression:  IMPRESSION:       1. Right IJ line. No pneumothorax. 2. Bibasilar atelectasis. Narrative:  Portable chest xray         COMPARISON: September 12, 2019       CLINICAL HISTORY: Central line placement. FINDINGS:       There is a right-sided IJ line with the tip in the area of SVC. No pneumothorax   is seen. There are bibasilar opacities, likely atelectasis. There is vascular   congestion. Heart is enlarged. Surrounding bones are osteopenic.           09/12/19 1923  XR CHEST PORT Final result    Impression:  FINDINGS / IMPRESSION :   There are some linear densities in the bases. Possible   infiltrate left base. Two-view chest may helpful. Upper lung zones are clear. Narrative:  CHEST X-RAY, one view. HISTORY:  Sepsis. TECHNIQUE:  AP upright portable view. COMPARISON: January 2015               CT Results  (Last 48 hours)    None        Xr Chest Port    Result Date: 9/13/2019  IMPRESSION: 1. Right IJ line. No pneumothorax. 2. Bibasilar atelectasis. Xr Chest Port    Result Date: 9/12/2019  FINDINGS / IMPRESSION :   There are some linear densities in the bases. Possible infiltrate left base. Two-view chest may helpful. Upper lung zones are clear. No results found for this visit on 09/12/19.     Labs and Studies from previous 24 hours have been personally reviewed by myself    ASSESSMENT      Active Hospital Problems    Diagnosis Date Noted    Pneumonia 09/12/2019    [facility] 09/12/2019    Overlapping malignant neoplasm of pancreas ([facility] 03/11/2019    HTN (hypertension) 01/07/2015     Hospital Problems as of 9/13/2019 Date Reviewed: 12/4/2017          Codes Class Noted - Resolved POA    Pneumonia ICD-10-CM: J18.9  ICD-9-CM: 486  9/12/2019 - Present Unknown        * (Principal) Sepsis ([facility] ICD-10-CM: A41.9  ICD-9-CM: 038.9, 995.91  9/12/2019 - Present Unknown        Overlapping malignant neoplasm of pancreas ([facility] ICD-10-CM: C25.8  ICD-9-CM: 157.8  3/11/2019 - Present Yes        HTN (hypertension) ICD-10-CM: I10  ICD-9-CM: 401.9  1/7/2015 - Present Yes              A/P:    -Septic shock  Could be secondary to ?gastroenteritis  C.difficile neg  Elevated PCT  Lactic acidosis likely related to sepsis  CXR and UA with no acute disease  Pulm evaluation appreciated  Cont vancomycin and cefepime day #1  On levophed and vasopressin. Keep MAP >65  Wean pressors as tolerated    -Pancreatic CA  Palliative care eval appreciated  Cont supportive care with pain medications  May need home hospice vs home health upon discharge    -PHILL  Likely related to ATN due to hypotension  Baseline 0.67 2015  Send urine electrolytes and get US kidneys  Strict I&O      DVT Prophylaxis: heparin  CODE Status: DNR  Plan of Care Discussed with: patient/. Care team.    Patient is at risk for decompensation due to septic shock, PHILL. Patient needs to be monitored closely at ICU.      Adryan Baez MD  09/13/19

## 2019-09-13 NOTE — CONSULTS
CONSULT NOTE    Marixa Roper    9/13/2019    Date of Admission:  9/12/2019    The patient's chart is reviewed and the patient is discussed with the staff. Subjective:     Patient is a 66 y.o.  female seen and evaluated at the request of Dr. Jose Martino. The patient is seen in consultation via [facility] protocol with sepsis of unclear etiology. Complaint of diarrhea for several days, hypotension now on levophed, on Vanc, Cefepime. Has renal failure. Levo currently at 4, but BP only 80/40. Patient is alert and awake and states she has pancreatic cancer and is not pursuing treatment, was on hospice, but considering revoking this because her quality of life is very good and didn't need their services. She has been independent, driving, going to play bridge with friends prior to getting ill the past couple days. She confirms DNR, but if able to get better with fairly conservative therapy would like to. She is okay with blood products, IVF, antibiotics and vasopressors as well as central line. She reports chills, but denies cough, sputum, nausea, redness. She has had some bruising, but no other skin changes. Review of Systems  A comprehensive review of systems was negative except for that written in the HPI. Patient Active Problem List   Diagnosis Code    TIA (transient ischemic attack) G45.9    HTN (hypertension) I10    GERD (gastroesophageal reflux disease) K21.9    Hyponatremia E87.1    Asymptomatic carotid artery stenosis I65.29    Pneumonia J18.9    Overlapping malignant neoplasm of pancreas ([facility] C25.8    Sepsis ([facility] A41.9     Prior to Admission Medications   Prescriptions Last Dose Informant Patient Reported? Taking? BACILLUS COAGULANS (DIGESTIVE ADVANTAGE PO)   Yes No   Sig: Take  by mouth. Biotin 2,500 mcg cap   Yes No   Sig: Take  by mouth daily. CALCIUM CARBONATE/VITAMIN D3 (CALCIUM 600 + D PO)   Yes No   Sig: take 1 Tab by mouth daily.    CRANBERRY FRUIT EXTRACT (CRANBERRY CONCENTRATE PO)   Yes No   Sig: Take  by mouth. LEVOTHYROXINE SODIUM (LEVOTHYROXINE PO)   Yes No   Sig: Take  by mouth. aspirin 81 mg chewable tablet   No No   Sig: Take 1 tablet by mouth daily. atorvastatin (LIPITOR) 80 mg tablet   Yes No   Sig: Take 80 mg by mouth daily. calcium carbonate (OS-ESTHELA) 500 mg calcium (1,250 mg) tablet   Yes No   Sig: Take  by mouth daily. carvedilol (COREG) 6.25 mg tablet   No No   Sig: Take 1 tablet by mouth two (2) times daily (with meals). cyanocobalamin 1,000 mcg tablet   Yes No   Sig: Take 1,000 mcg by mouth daily. ergocalciferol (VITAMIN D2) 50,000 unit capsule   Yes No   Sig: Take 50,000 Units by mouth. 1 twice weekly   esomeprazole (NEXIUM) 20 mg capsule   Yes No   Sig: Take 20 mg by mouth every morning. Indications: GASTROESOPHAGEAL REFLUX, HEARTBURN   magnesium 250 mg tab   Yes No   Sig: Take  by mouth. Indications: HYPOMAGNESEMIA   meloxicam (MOBIC) 7.5 mg tablet   No No   Sig: Take 1 Tab by mouth daily. rosuvastatin (CRESTOR) 20 mg tablet   Yes No   Sig: Take 20 mg by mouth nightly. tiZANidine (ZANAFLEX) 4 mg tablet   No No   Sig: Take 1 Tab by mouth nightly. tolterodine ER (DETROL LA) 4 mg ER capsule   Yes No   Sig: Take 4 mg by mouth daily. valsartan (DIOVAN) 160 mg tablet   Yes No   Sig: Take  by mouth daily.       Facility-Administered Medications: None     Past Medical History:   Diagnosis Date    Arthritis     osteo    Chronic pain     left knee    GERD (gastroesophageal reflux disease)     nexium    Hypercholesteremia     Hypertension     on 2 meds    Left knee pain     >5 months    PAD (peripheral artery disease) (HCC)     awaiting a Left carotid endarterctomy    TIA (transient ischemic attack) 1/7/2015     Past Surgical History:   Procedure Laterality Date    HX CHOLECYSTECTOMY      HX COLONOSCOPY      HX OPEN CHOLECYSTECTOMY  age 27    HX ORTHOPAEDIC Left 9/2014    knee scope    NE BIOPSY OF BREAST, NEEDLE CORE Right 2009    left breast bx    VASCULAR SURGERY PROCEDURE UNLIST Left     carotid endarterectomy      Social History     Socioeconomic History    Marital status:      Spouse name: Not on file    Number of children: Not on file    Years of education: Not on file    Highest education level: Not on file   Occupational History    Not on file   Social Needs    Financial resource strain: Not on file    Food insecurity:     Worry: Not on file     Inability: Not on file    Transportation needs:     Medical: Not on file     Non-medical: Not on file   Tobacco Use    Smoking status: Never Smoker    Smokeless tobacco: Never Used   Substance and Sexual Activity    Alcohol use:  Yes     Alcohol/week: 5.8 standard drinks     Types: 7 Glasses of wine per week    Drug use: No    Sexual activity: Not on file   Lifestyle    Physical activity:     Days per week: Not on file     Minutes per session: Not on file    Stress: Not on file   Relationships    Social connections:     Talks on phone: Not on file     Gets together: Not on file     Attends Sikh service: Not on file     Active member of club or organization: Not on file     Attends meetings of clubs or organizations: Not on file     Relationship status: Not on file    Intimate partner violence:     Fear of current or ex partner: Not on file     Emotionally abused: Not on file     Physically abused: Not on file     Forced sexual activity: Not on file   Other Topics Concern    Not on file   Social History Narrative    Not on file     Family History   Problem Relation Age of Onset    Hypertension Mother      Allergies   Allergen Reactions    Penicillins Unknown (comments)    Sulfa (Sulfonamide Antibiotics) Hives     Current Facility-Administered Medications   Medication Dose Route Frequency    aspirin chewable tablet 81 mg  81 mg Oral DAILY    carvedilol (COREG) tablet 6.25 mg  6.25 mg Oral BID WITH MEALS    rosuvastatin (CRESTOR) tablet 20 mg  20 mg Oral QHS    . PHARMACY TO SUBSTITUTE PER PROTOCOL (Reordered from: tolterodine ER (DETROL LA) 4 mg ER capsule)    Per Protocol    . PHARMACY TO SUBSTITUTE PER PROTOCOL (Reordered from: LEVOTHYROXINE SODIUM (LEVOTHYROXINE PO))    Per Protocol    calcium carbonate (OS-ESTHELA) tablet 500 mg [elemental]  500 mg Oral DAILY    cyanocobalamin tablet 1,000 mcg  1,000 mcg Oral DAILY    meloxicam (MOBIC) tablet 7.5 mg  7.5 mg Oral DAILY    tiZANidine (ZANAFLEX) tablet 4 mg  4 mg Oral QHS    sodium chloride (NS) flush 5-40 mL  5-40 mL IntraVENous Q8H    sodium chloride (NS) flush 5-40 mL  5-40 mL IntraVENous PRN    acetaminophen (TYLENOL) tablet 650 mg  650 mg Oral Q4H PRN    ondansetron (ZOFRAN) injection 4 mg  4 mg IntraVENous Q4H PRN    0.9% sodium chloride infusion  150 mL/hr IntraVENous CONTINUOUS    HYDROcodone-acetaminophen (NORCO) 5-325 mg per tablet 1 Tab  1 Tab Oral Q4H PRN    morphine injection 1 mg  1 mg IntraVENous Q4H PRN    heparin (porcine) injection 5,000 Units  5,000 Units SubCUTAneous Q8H    cefepime (MAXIPIME) 1 g in 0.9% sodium chloride (MBP/ADV) 50 mL  1 g IntraVENous Q8H    NOREPINephrine (LEVOPHED) 4 mg/250 mL (16 mcg/mL) in NS infusion  0-16 mcg/min IntraVENous TITRATE     Objective:     Vitals:    09/13/19 0205 09/13/19 0215 09/13/19 0229 09/13/19 0237   BP:  (!) 62/40 (!) 68/41 (!) 68/41   Pulse:  96 96 95   Resp:  29 25    Temp:       SpO2:  95% 94% 96%   Weight:       Height: 5' 1.5\" (1.562 m)        PHYSICAL EXAM     Constitutional:  the patient is well developed and in no acute distress  EENMT:  Sclera clear, pupils equal, oral mucosa moist  Respiratory: diminished, but clear  Cardiovascular:  RRR without M,G,R  Gastrointestinal: soft and non-tender; with positive bowel sounds. Musculoskeletal: warm without cyanosis. There is no lower extremity edema.   Skin:  no jaundice or rashes, no wounds   Neurologic: no gross neuro deficits     Psychiatric:  alert and oriented x 4    CXR: Mild basilar atelectasis, no infiltrate, no overt pneumonia      Recent Labs     09/12/19 1907   WBC 2.8*   HGB 10.2*   HCT 33.2*        Recent Labs     09/12/19 1907      K 3.1*      GLU 72   CO2 14*   BUN 18   CREA 2.20*   CA 8.5   ALB 2.5*   TBILI 1.4*   ALT 48   SGOT 109*     Recent Labs     09/12/19 1942   PHI 7.309*   PCO2I 18.9*   PO2I 78   HCO3I 9.5*     No results for input(s): LCAD, LAC in the last 72 hours. Assessment:  (Medical Decision Making)     Hospital Problems  Date Reviewed: 12/4/2017          Codes Class Noted POA    Pneumonia ICD-10-CM: J18.9  ICD-9-CM: 727  9/12/2019 Unknown        * (Principal) Sepsis ([facility] ICD-10-CM: A41.9  ICD-9-CM: 038.9, 995.91  9/12/2019 Unknown        Overlapping malignant neoplasm of pancreas ([facility] ICD-10-CM: C25.8  ICD-9-CM: 157.8  3/11/2019 Yes        HTN (hypertension) ICD-10-CM: I10  ICD-9-CM: 401.9  1/7/2015 Yes            65 y/o female with septic shock, only localizing symptom is diarrhea and could be hypovolemic shock, but with PCT of 31 suspect this is more likely septic shock. Plan:  (Medical Decision Making)     --will place CVC for ongoing supportive care and vasopressor need  --change IVF to bicarb gtt with renal failure, acidosis, diarrhea (bicarb was 9 on ABG)  --agree with antibiotics for now  --cultures pending  --monitor diarrhea, consider Cdiff if persists    DNR    More than 50% of the time documented was spent in face-to-face contact with the patient and in the care of the patient on the floor/unit where the patient is located. Thank you very much for this referral.  We appreciate the opportunity to participate in this patient's care. Will follow along with above stated plan.     Geno Stevens MD

## 2019-09-13 NOTE — PROGRESS NOTES
Initial visit made to patient and a prayer was provided for the patient and three family members. A  card was left. Palliative Care representative also met with this family today.         [employer]

## 2019-09-13 NOTE — PROGRESS NOTES
Patient very restless, still c/o abdominal pain/back pain that is unrelenting with change in position. Orders received from Dr. Ai Peters for 2 mg morphine q1 hr as needed   Orders received for 1 amp bicarb now and then repeat BMP one hour after. Patient wheezing, SpO2 >90% on 4L NC. Orders received for albuterol q 4 and prn.

## 2019-09-13 NOTE — CDMP QUERY
Pt admitted with  Sepsis/Pt noted to have elevated creatinine.  If possible, please document in the progress notes and d/c summary if you are evaluating and / or treating any of the following:     Acute kidney failure   Acute kidney failure with tubular necrosis    Acute kidney injury   Other type of acute kidney failure    Other cause (please specify)   Unable to determine    Unknown      The medical record reflects the following:    Risk Factors: Sepsis    Clinical Indicators: creatinine 2.20  2.50    Treatment: IVF's, serial labs    Thank you,  Andrew Caceres RN  Clinical   [phone]

## 2019-09-13 NOTE — PROGRESS NOTES
TRANSFER - IN REPORT:    Verbal report received from Olivia Garner RN on Cord[facility]  being received from ED for routine progression of care      Report consisted of patients Situation, Background, Assessment and   Recommendations(SBAR). Information from the following report(s) SBAR, Kardex, ED Summary, MAR, Recent Results, Med Rec Status and Cardiac Rhythm SR was reviewed with the receiving nurse. Opportunity for questions and clarification was provided. Assessment completed upon patients arrival to unit and care assumed. Dual skin assessment completed with PA Oliver findings were Skin color, texture, turgor normal. No rashes or lesions. Skin tear to L FA and scattered ecchymosis on BUE. Abrasions and ecchymosis also to BLE in multiple areas. Allevyn placed with no sign of breakdown to sacrum.

## 2019-09-13 NOTE — ED NOTES
TRANSFER - OUT REPORT:    Verbal report given to Zoey Thrasher RN(name) on Cord[facility]  being transferred to CCU(unit) for routine progression of care       Report consisted of patients Situation, Background, Assessment and   Recommendations(SBAR). Information from the following report(s) SBAR, ED Summary, [facility] and Recent Results was reviewed with the receiving nurse. Lines:   Peripheral IV 09/12/19 Left Hand (Active)   Site Assessment Clean, dry, & intact 9/12/2019  7:10 PM   Phlebitis Assessment 0 9/12/2019  7:10 PM   Infiltration Assessment 0 9/12/2019  7:10 PM   Dressing Status Clean, dry, & intact 9/12/2019  7:10 PM   Hub Color/Line Status Green 9/12/2019  7:10 PM       Peripheral IV 09/12/19 Right Wrist (Active)   Site Assessment Clean, dry, & intact 9/12/2019  7:11 PM   Phlebitis Assessment 0 9/12/2019  7:11 PM   Infiltration Assessment 0 9/12/2019  7:11 PM   Dressing Status Clean, dry, & intact 9/12/2019  7:11 PM   Hub Color/Line Status Green 9/12/2019  7:11 PM        Opportunity for questions and clarification was provided.       Patient transported with:   Monitor  O2 @ 2 liters  Registered Nurse

## 2019-09-13 NOTE — PROGRESS NOTES
Pt seen in CCU s/p admission Pneumonia/sepsis. Alert and oriented currently. States she has been with [address] [place] [address] since March when she was dx with pancreatic ca. Children at bedside and pt confirms demographics. Pt was independent prior to admission with ADL's, shopping, etc. Pt and family unsure at this time regarding d/c POC. CM will follow to assist with resumption of [place], HH vs STR. Pt may need PT/OT when medically stable to help with decisions of d/c needs. CM to follow. Care Management Interventions  PCP Verified by CM: Yes(transition back to PCP per pt from Hospice)  Mode of Transport at Discharge:  Other (see comment)  Transition of Care Consult (CM Consult): Home Hospice, Discharge Planning(was current with [facility])  Discharge Durable Medical Equipment: (SC, walker)  Current Support Network: Lives with Spouse(lives with spouse, Brenda Hernandez, has 3 children)  Confirm Follow Up Transport: Family  Plan discussed with Pt/Family/Caregiver: Yes  Freedom of Choice Offered: Yes  [address] Provided?: (confirms [employer] - able to get rx)  Discharge Location  Discharge Placement: Unable to determine at this time

## 2019-09-13 NOTE — INTERVAL H&P NOTE
H&P Update:  Melissa Torres was seen and examined. History and physical has been reviewed. The patient has been examined.  There have been no significant clinical changes since the completion of the originally dated History and Physical.

## 2019-09-14 PROBLEM — R91.8 PULMONARY INFILTRATES: Status: ACTIVE | Noted: 2019-01-01

## 2019-09-14 PROBLEM — R65.21 SEPTIC SHOCK (HCC): Status: ACTIVE | Noted: 2019-01-01

## 2019-09-14 PROBLEM — R78.81 BACTEREMIA DUE TO GRAM-NEGATIVE BACTERIA: Status: ACTIVE | Noted: 2019-01-01

## 2019-09-14 NOTE — PROGRESS NOTES
Hospitalist Progress Note    2019  Admit Date: 2019  6:58 PM   NAME: Jenaro Piper   :  1945   DOS:              19  MRN:  [id number]   Attending: Martínez Santiago MD  PCP:  Del Holbrook MD  Treatment Team: Attending Provider: Marylin Farias MD; Consulting Provider: Amy Snyder MD; Utilization Review: Reuel Sicard, RN; Care Manager: Marzella Kayser, RN; Consulting Provider: Onesimo Puentes NP; Consulting Provider: Ema Martinez MD    DNR     SUBJECTIVE:   As previously documented:  77yoF with pancreatic cancer on home hospice who presents to the ER for confusion and weakness. She has been having diarrhea for about 1 week. In the ED patient was hypotensive that required pressors support. Patient started on IV vanco/cefepime for sepsis ? PNA vs gastroenteritis. Blood cultures 1/2 with GNRs.       19    Jenaro Piper is pleasantly confused lying in the bed. Nurse reported patient is on more pressors this morning. I discussed with family at bedside about patient condition/goals of care. They need some time to discuss about comfort measures only. 10+ ROS reviewed and negative except for positive in HPI.    Allergies   Allergen Reactions    Penicillins Unknown (comments)    Sulfa (Sulfonamide Antibiotics) Hives     Current Facility-Administered Medications   Medication Dose Route Frequency    NOREPINephrine (LEVOPHED) 8 mg in dextrose 5% 250 mL infusion  0.5-30 mcg/min IntraVENous TITRATE    PHENYLephrine (ISSA-SYNEPHRINE) 90 mg in 0.9% sodium chloride 250 mL infusion   mcg/min IntraVENous TITRATE    LORazepam (ATIVAN) injection 0.5 mg  0.5 mg IntraVENous Q2H PRN    [START ON 9/15/2019] cefepime (MAXIPIME) 2 g in 0.9% sodium chloride (MBP/ADV) 100 mL  2 g IntraVENous Q24H    vancomycin (VANCOCIN) 1,000 mg in 0.9% sodium chloride (MBP/ADV) 250 mL  1,000 mg IntraVENous ONCE    aspirin chewable tablet 81 mg  81 mg Oral DAILY    carvedilol (COREG) tablet 6.25 mg  6.25 mg Oral BID WITH MEALS    rosuvastatin (CRESTOR) tablet 20 mg  20 mg Oral QHS    oxybutynin chloride XL (DITROPAN XL) tablet 5 mg  5 mg Oral DAILY    levothyroxine (SYNTHROID) tablet 75 mcg  75 mcg Oral ACB    calcium carbonate (OS-ESTHELA) tablet 500 mg [elemental]  500 mg Oral DAILY    cyanocobalamin tablet 1,000 mcg  1,000 mcg Oral DAILY    tiZANidine (ZANAFLEX) tablet 4 mg  4 mg Oral QHS    sodium chloride (NS) flush 5-40 mL  5-40 mL IntraVENous Q8H    sodium chloride (NS) flush 5-40 mL  5-40 mL IntraVENous PRN    acetaminophen (TYLENOL) tablet 650 mg  650 mg Oral Q4H PRN    ondansetron (ZOFRAN) injection 4 mg  4 mg IntraVENous Q4H PRN    HYDROcodone-acetaminophen (NORCO) 5-325 mg per tablet 1 Tab  1 Tab Oral Q4H PRN    heparin (porcine) injection 5,000 Units  5,000 Units SubCUTAneous Q8H    vasopressin (VASOSTRICT) 20 Units in 0.9% sodium chloride 100 mL infusion  0-0.03 Units/min IntraVENous TITRATE    influenza vaccine 2019-20 (6 mos+)(PF) (FLUARIX/FLULAVAL/FLUZONE QUAD) injection 0.5 mL  0.5 mL IntraMUSCular PRIOR TO DISCHARGE    Vancomycin Intermittent Dosing per pharmacy   1,000 mg IntraVENous See Admin Instructions    lidocaine 4 % patch 1 Patch  1 Patch TransDERmal Q24H    morphine injection 2 mg  2 mg IntraVENous Q1H PRN    albuterol (PROVENTIL VENTOLIN) nebulizer solution 2.5 mg  2.5 mg Nebulization Q4H PRN    LORazepam (ATIVAN) injection 0.5 mg  0.5 mg IntraVENous ONCE         There is no immunization history for the selected administration types on file for this patient.   Objective:     Patient Vitals for the past 24 hrs:   Temp Pulse Resp BP SpO2   09/14/19 1000  82 13 (!) 78/39 97 %   09/14/19 0945  82 16 133/52 98 %   09/14/19 0929  91 16 105/59 96 %   09/14/19 0913  85 13 106/52 96 %   09/14/19 0859  83 11 101/55 96 %   09/14/19 0844  84 10 105/56 96 %   09/14/19 0829  86 14 108/54 97 %   09/14/19 0813  86 14 121/57 96 %   09/14/19 0759  86 13 97/53 97 %   09/14/19 0744  84 10 98/50 96 %   09/14/19 0729  87 13 107/52 96 %   09/14/19 0713 99.3 °F (37.4 °C) 87 13 101/55 97 %   09/14/19 0659  86 12 93/50 97 %   09/14/19 0613  85 12 99/50 93 %   09/14/19 0610  87 14 110/53 94 %   09/14/19 0559  84 12 118/57 94 %   09/14/19 0544  86 15 101/50 94 %   09/14/19 0529  84 13 108/53 94 %   09/14/19 0522  86 11 123/52 93 %   09/14/19 0508  84 (!) 34 102/55 95 %   09/14/19 0458  82 18 99/54 93 %   09/14/19 0449  83 20 99/53 92 %   09/14/19 0439  83 15 96/53 93 %   09/14/19 0434  84 17 101/51 92 %   09/14/19 0429  83 14 101/55 93 %   09/14/19 0424  84 12 93/55 93 %   09/14/19 0419  83 17 98/53 94 %   09/14/19 0413  84 27 104/54 94 %   09/14/19 0408  84 27 95/51 94 %   09/14/19 0403  84 21 97/54 94 %   09/14/19 0358  84 20 94/52 94 %   09/14/19 0354  85 17 101/52 95 %   09/14/19 0344  83 21 (!) 79/50 95 %   09/14/19 0341  84 17 (!) 83/50 95 %   09/14/19 0329  83 16 (!) 81/41 96 %   09/14/19 0315 98.3 °F (36.8 °C) 90 30 121/59 92 %   09/14/19 0258  83 14 104/55 98 %   09/14/19 0244  81 14 108/58 97 %   09/14/19 0229  81 14 103/59 97 %   09/14/19 0213  83 14 102/55 98 %   09/14/19 0158  82 19 102/51 98 %   09/14/19 0144  82 22 97/53 98 %   09/14/19 0129  81 16 94/55 97 %   09/14/19 0114  81 16 100/57 99 %   09/14/19 0058  82 17 99/58 97 %   09/14/19 0039  82 16 (!) 88/50 95 %   09/14/19 0029  83 21 (!) 89/55 95 %   09/14/19 0019  84 16 (!) 85/49 95 %   09/14/19 0008  85 20 (!) 82/53 95 %   09/13/19 2358  88 12 (!) 84/52 96 %   09/13/19 2349  89  (!) 82/52 93 %   09/13/19 2339  97  96/53 95 %   09/13/19 2329  98  121/68 91 %   09/13/19 2319 97.5 °F (36.4 °C) (!) 104 23 114/74 94 %   09/13/19 2311  (!) 107 24 128/68 94 %   09/13/19 2301  99 (!) 48 106/58 95 %   09/13/19 2250  (!) 106 25 168/72 93 %   09/13/19 2240  98 (!) 33 127/74 95 %   09/13/19 2229  92 21 110/65 96 %   09/13/19 2220  99 (!) 43 114/67 94 % 09/13/19 2209  97 (!) 49 133/60 96 %   09/13/19 2158  90 22 105/54 99 %   09/13/19 2154  95 9 143/75 97 %   09/13/19 2149  90 (!) 33 (!) 89/60 98 %   09/13/19 2142  91 30 93/52 100 %   09/13/19 2130  91 14 (!) 83/54 92 %   09/13/19 2115  84 12     09/13/19 2114  85 14 92/50    09/13/19 2113  86 13     09/13/19 2100  87 13 (!) 84/48    09/13/19 2050  91 17 (!) 129/98 (!) 83 %   09/13/19 2030  88 (!) 33 94/62 100 %   09/13/19 2014  89 14 109/66 98 %   09/13/19 1958  91 13 113/69 96 %   09/13/19 1947  96 16  97 %   09/13/19 1946  96 21     09/13/19 1945  97 15     09/13/19 1944  97 23 115/68    09/1943  97 23     09/13/19 1942  99 (!) 46  (!) 84 %   09/13/19 1941  97 19  91 %   09/13/19 1930  91 17 112/65 93 %   09/13/19 1914 97.7 °F (36.5 °C) 96 19 115/77 (!) 79 %   09/13/19 1859  96 (!) 46 107/59 (!) 86 %   09/13/19 1844  90 26 91/62 100 %   09/13/19 1829    (!) 89/60    09/13/19 1828  94 27  100 %   09/13/19 1827  96 21 (!) 82/58 100 %   09/13/19 1819  88 13 (!) 78/53 (!) 86 %   09/13/19 1814  90 16 (!) 79/52    09/13/19 1813  90 21  93 %   09/13/19 1810  92 25 (!) 89/62 96 %   09/13/19 1804  92 12 (!) 87/53    09/13/19 1801  94 20 113/53    09/13/19 1758  96 22     09/13/19 1754  99  109/54    09/13/19 1752  99  117/66    09/13/19 1747  95 (!) 32 (!) 70/44    09/13/19 1741    (!) 80/52    09/13/19 1739    (!) 82/51    09/13/19 1737    (!) 74/48    09/13/19 1736    (!) 80/45    09/13/19 1731  92 10 (!) 71/41 100 %   09/13/19 1730  93 21 (!) 66/40 98 %   09/13/19 1724  100 23 138/56 96 %   09/13/19 1658  99 25 127/86 (!) 89 %   09/13/19 1638  (!) 104 21  90 %   09/13/19 1635  (!) 107  100/63 94 %   09/13/19 1625  (!) 109  126/78    09/13/19 1558 96.9 °F (36.1 °C) (!) 103 27 103/58 97 %   09/13/19 1544  (!) 101  100/57 96 %   09/13/19 1543  (!) 101 17  91 %   09/13/19 1529  (!) 107 24 112/55 (!) 84 %   09/13/19 1522  (!) 104 22  95 %   19 1521  (!) 104 22  94 %   19 1514    114/56    19 1459  (!) 101 26 (!) 84/49 90 %   19 1443  (!) 102 28 (!) 83/65 (!) 87 %   19 1429  (!) 101 16 (!) 77/47 91 %   19 1344  (!) 103  (!) 78/52 93 %   19 1343  (!) 104 26  94 %   19 1333    (!) 75/47    19 1332  (!) 104 23 (!) 72/52 93 %   19 1319  (!) 104 26 (!) 87/58    19 1316     96 %   19 1314  (!) 106 27 (!) 81/65    19 1308  (!) 107 15  91 %   19 1230  (!) 107 26  91 %   19 1229  (!) 107  105/59    19 1216  (!) 107 24  92 %   19 1215 97.4 °F (36.3 °C) (!) 108  113/69 94 %   19 1213   21     19 1205  (!) 106 23 94/53 91 %   19 1145  (!) 105  (!) 123/102 90 %   19 1143 97.4 °F (36.3 °C)       19 1142  (!) 105 28  95 %   19 1132  (!) 104 21  94 %   19 1129  (!) 103  97/59 93 %     Temp (24hrs), Av.8 °F (36.6 °C), Min:96.9 °F (36.1 °C), Max:99.3 °F (37.4 °C)    Oxygen Therapy  O2 Sat (%): 97 % (19 1000)  Pulse via Oximetry: 83 beats per minute (19 1000)  O2 Device: Hi flow nasal cannula;Humidifier (19 0713)  O2 Flow Rate (L/min): 8 l/min (19)  Oxygen Therapy  O2 Sat (%): 97 % (19 1000)  Pulse via Oximetry: 83 beats per minute (19 1000)  O2 Device: Hi flow nasal cannula;Humidifier (19)  O2 Flow Rate (L/min): 8 l/min (19)    Physical Exam:  General:         Alert, cooperative, no distress   HEENT:               Nares normal. No drainage  Lungs: No wheezing/rhonchi/rales  Cardiovascular:   RRR. No m/r/g. No pedal edema b/l. Abdomen:       Mid abdomen tenderness no guarding or rebound. (+) BS  Skin:         No rashes or lesions. Not Jaundiced  Neurologic:     No gross focal deficit. Psychiatric:         Good mood. Normal affect.               DIAGNOSTIC STUDIES Data Review:   Recent Results (from the past 24 hour(s))   LACTIC ACID    Collection Time: 09/13/19  2:10 PM   Result Value Ref Range    Lactic acid 7.2 (HH) 0.4 - 2.0 MMOL/L   METABOLIC PANEL, BASIC    Collection Time: 09/13/19  2:10 PM   Result Value Ref Range    Sodium 138 136 - 145 mmol/L    Potassium 3.2 (L) 3.5 - 5.1 mmol/L    Chloride 100 98 - 107 mmol/L    CO2 22 21 - 32 mmol/L    Anion gap 16 7 - 16 mmol/L    Glucose 111 (H) 65 - 100 mg/dL    BUN 25 (H) 8 - 23 MG/DL    Creatinine 2.86 (H) 0.6 - 1.0 MG/DL    GFR est AA 21 (L) >60 ml/min/1.73m2    GFR est non-AA 17 (L) >60 ml/min/1.73m2    Calcium 7.3 (L) 8.3 - 10.4 MG/DL   SODIUM, UR, RANDOM    Collection Time: 09/13/19  3:39 PM   Result Value Ref Range    Sodium,urine random 81 MMOL/L   CREATININE, UR, RANDOM    Collection Time: 09/13/19  3:39 PM   Result Value Ref Range    Creatinine, urine 69.70 mg/dL   POC G3    Collection Time: 09/13/19  5:59 PM   Result Value Ref Range    Device: NASAL CANNULA      pH (POC) 7.324 (L) 7.35 - 7.45      pCO2 (POC) 44.2 35 - 45 MMHG    pO2 (POC) 83 75 - 100 MMHG    HCO3 (POC) 23.0 22 - 26 MMOL/L    sO2 (POC) 95 95 - 98 %    Base deficit (POC) 3 mmol/L    Allens test (POC) YES      Site RIGHT RADIAL      Patient temp. 98.6      Specimen type (POC) ARTERIAL      Performed by Morales     CO2, POC 24 MMOL/L    Flow rate (POC) 6.000 L/min    Critical value read back 00:01     COLLECT TIME 1,750     POC G3    Collection Time: 09/13/19 11:39 PM   Result Value Ref Range    Device: NASAL CANNULA      pH (POC) 7.271 (L) 7.35 - 7.45      pCO2 (POC) 50.3 (H) 35 - 45 MMHG    pO2 (POC) 78 75 - 100 MMHG    HCO3 (POC) 23.1 22 - 26 MMOL/L    sO2 (POC) 93 (L) 95 - 98 %    Base deficit (POC) 4 mmol/L    Allens test (POC) YES      Site RIGHT RADIAL      Patient temp.  98.6      Specimen type (POC) ARTERIAL      Performed by Libia     CO2, POC 25 MMOL/L    Flow rate (POC) 8.000 L/min    Respiratory comment: NurseNotified COLLECT TIME 0,703     METABOLIC PANEL, BASIC    Collection Time: 09/14/19  2:11 AM   Result Value Ref Range    Sodium 138 136 - 145 mmol/L    Potassium 3.3 (L) 3.5 - 5.1 mmol/L    Chloride 99 98 - 107 mmol/L    CO2 26 21 - 32 mmol/L    Anion gap 13 7 - 16 mmol/L    Glucose 77 65 - 100 mg/dL    BUN 28 (H) 8 - 23 MG/DL    Creatinine 3.18 (H) 0.6 - 1.0 MG/DL    GFR est AA 18 (L) >60 ml/min/1.73m2    GFR est non-AA 15 (L) >60 ml/min/1.73m2    Calcium 7.1 (L) 8.3 - 10.4 MG/DL   CBC WITH AUTOMATED DIFF    Collection Time: 09/14/19  2:11 AM   Result Value Ref Range    WBC 28.8 (H) 4.3 - 11.1 K/uL    RBC 3.46 (L) 4.05 - 5.2 M/uL    HGB 9.6 (L) 11.7 - 15.4 g/dL    HCT 30.5 (L) 35.8 - 46.3 %    MCV 88.2 79.6 - 97.8 FL    MCH 27.7 26.1 - 32.9 PG    MCHC 31.5 31.4 - 35.0 g/dL    RDW 14.0 11.9 - 14.6 %    PLATELET 131 831 - 644 K/uL    MPV 10.4 9.4 - 12.3 FL    ABSOLUTE NRBC 0.02 0.0 - 0.2 K/uL    NEUTROPHILS 80 (H) 43 - 78 %    LYMPHOCYTES 5 (L) 13 - 44 %    MONOCYTES 2 (L) 4.0 - 12.0 %    EOSINOPHILS 0 (L) 0.5 - 7.8 %    BASOPHILS 1 0.0 - 2.0 %    IMMATURE GRANULOCYTES 12 (H) 0.0 - 5.0 %    ABS. NEUTROPHILS 23.0 (H) 1.7 - 8.2 K/UL    ABS. LYMPHOCYTES 1.4 0.5 - 4.6 K/UL    ABS. MONOCYTES 0.6 0.1 - 1.3 K/UL    ABS. EOSINOPHILS 0.0 0.0 - 0.8 K/UL    ABS. BASOPHILS 0.3 (H) 0.0 - 0.2 K/UL    ABS. IMM. GRANS.  3.5 (H) 0.0 - 0.5 K/UL    RBC COMMENTS SLIGHT  ANISOCYTOSIS + POIKILOCYTOSIS        WBC COMMENTS Result Confirmed By Smear      PLATELET COMMENTS ADEQUATE      DF AUTOMATED     LACTIC ACID    Collection Time: 09/14/19  2:11 AM   Result Value Ref Range    Lactic acid 4.4 (HH) 0.4 - 2.0 MMOL/L   POC G3    Collection Time: 09/14/19  5:56 AM   Result Value Ref Range    Device: NASAL CANNULA      pH (POC) 7.362 7.35 - 7.45      pCO2 (POC) 44.1 35 - 45 MMHG    pO2 (POC) 94 75 - 100 MMHG    HCO3 (POC) 25.0 22 - 26 MMOL/L    sO2 (POC) 97 95 - 98 %    Base deficit (POC) 1 mmol/L    Allens test (POC) YES      Site RIGHT RADIAL Patient temp. 98.6      Specimen type (POC) ARTERIAL      Performed by [email or web address]     CO2, POC 26 MMOL/L    Flow rate (POC) 8.000 L/min    Respiratory comment: NurseNotified     COLLECT TIME 555     VANCOMYCIN, RANDOM    Collection Time: 09/14/19  9:35 AM   Result Value Ref Range    Vancomycin, random 17.7 UG/ML       All Micro Results     Procedure Component Value Units Date/Time    CULTURE, BLOOD [[id number]] Collected:  09/14/19 1048    Order Status:  Completed Specimen:  Blood Updated:  09/14/19 1055    CULTURE, BLOOD [[id number]]     Order Status:  Sent Specimen:  Blood     CULTURE, BLOOD [[id number]] Collected:  09/12/19 1907    Order Status:  Completed Specimen:  Blood Updated:  09/14/19 0751     Special Requests: --        LEFT  HAND       GRAM STAIN GRAM NEGATIVE RODS         AEROBIC BOTTLE POSITIVE               RESULTS VERIFIED, PHONED TO AND READ BACK BY Maci Ash @84 ON 9/14 AK. Culture result:       CULTURE IN [address] UPDATES TO FOLLOW            REFER TO [facility] Trish Reynolds PANEL [address] [facility].[id number]    BLOOD CULTURE ID PANEL [[id number]]  (Abnormal) Collected:  09/12/19 1907    Order Status:  Completed Specimen:  Blood Updated:  09/14/19 0746     Acc. no. from Micro Order [id number]     Klebsiella pneumoniae DETECTED        KPC (Carbapenem Resistance Gene) NOT DETECTED        Comment: WARNING:  A Not Detected result for the KPC gene does not indicate susceptibility to carbapenems. Gram negative bacteria can be resistant to carbapenems by mechanisms other than carrying the KPC gene.        CULTURE, BLOOD [[id number]] Collected:  09/12/19 1922    Order Status:  Completed Specimen:  Blood Updated:  09/14/19 0634     Special Requests: --        RIGHT  FOREARM       Culture result: NO GROWTH 2 DAYS       C. DIFFICILE AG & TOXIN A/B [[id number]] Collected:  09/13/19 0501    Order Status:  Completed Specimen:  Stool Updated:  09/13/19 1152     [address] Telephone ANTIGEN       C. DIFFICILE GDH ANTIGEN-NEGATIVE C. difficile toxin       C. DIFFICILE TOXIN-NEGATIVE           PCR Reflex NOT APPLICABLE        INTERPRETATION       NEGATIVE FOR TOXIGENIC C. DIFFICILE           Clinical Consideration       NEGATIVE FOR TOXIGENIC C. DIFFICILE                Imaging /Procedures /Studies:    CXR Results  (Last 48 hours)               09/13/19 2327  XR CHEST SNGL V Final result    Impression:  IMPRESSION: Acute interstitial pulmonary edema. Narrative:  EXAM: XR CHEST SNGL V       INDICATION: ? Fluid overload. COMPARISON: 9/13/2019       FINDINGS: A portable AP radiograph of the chest was obtained at 2322 hours. The   patient is on a cardiac monitor. Diffuse increased interstitial markings worse   in the interval. Unremarkable central venous catheter. . The cardiac and   mediastinal contours and pulmonary vascularity are normal.  The bones and soft   tissues are grossly within normal limits. 09/13/19 0400  XR CHEST PORT Final result    Impression:  IMPRESSION:       1. Right IJ line. No pneumothorax. 2. Bibasilar atelectasis. Narrative:  Portable chest xray         COMPARISON: September 12, 2019       CLINICAL HISTORY: Central line placement. FINDINGS:       There is a right-sided IJ line with the tip in the area of SVC. No pneumothorax   is seen. There are bibasilar opacities, likely atelectasis. There is vascular   congestion. Heart is enlarged. Surrounding bones are osteopenic.           09/12/19 1923  XR CHEST PORT Final result    Impression:  FINDINGS / IMPRESSION :   There are some linear densities in the bases. Possible   infiltrate left base. Two-view chest may helpful. Upper lung zones are clear. Narrative:  CHEST X-RAY, one view. HISTORY:  Sepsis. TECHNIQUE:  AP upright portable view.        COMPARISON: January 2015               CT Results  (Last 48 hours)    None        Xr Chest Sngl V    Result Date: 9/13/2019  IMPRESSION: Acute interstitial pulmonary edema. [employer]    Result Date: 9/13/2019  IMPRESSION: No acute findings. No evidence of renal obstruction    No results found for this visit on 09/12/19. Labs and Studies from previous 24 hours have been personally reviewed by myself Nikolas Sorenson 96 Problems    Diagnosis Date Noted    Pneumonia 09/12/2019    [facility] 09[facility]12/2019    Overlapping malignant neoplasm of pancreas ([facility] 03/11/2019    HTN (hypertension) 01/07/2015     Hospital Problems as of 9/14/2019 Date Reviewed: 12/4/2017          Codes Class Noted - Resolved POA    Pneumonia ICD-10-CM: J18.9  ICD-9-CM: 486  9/12/2019 - Present Unknown        * (Principal) Sepsis ([facility] ICD-10-CM: A41.9  ICD-9-CM: 038.9, 995.91  9/12/2019 - Present Unknown        Overlapping malignant neoplasm of pancreas ([facility] ICD-10-CM: C25.8  ICD-9-CM: 157.8  3/11/2019 - Present Yes        HTN (hypertension) ICD-10-CM: I10  ICD-9-CM: 401.9  1/7/2015 - Present Yes              A/P:    -Septic shock  Could be secondary to PNA vs gastroenteritis  Lactic acidosis likely related to sepsis  Pulm following  BC with GNRs  Cont vancomycin and cefepime day #2  On levophed, ISSA and vasopressin. Keep MAP >65  Wean pressors as tolerated  Patient critically ill with underlying pancreatic CA not on treatment likely will benefit from comfort measures only. Family needs some time to disccuss    -Pancreatic CA  Palliative care eval appreciated  Cont supportive care with pain medications  May need home hospice vs home health upon discharge    -PHILL  Likely related to ATN due to hypotension  Baseline 0.67 2015  Nephrology eval recommended supportive care  Strict I&O    -Fluid overload  Patient is requiring more O2  CXR with interstitial pulm edema  Add on BNP and stop bicar drip. Last BG pH 7.36. bicar 25      DVT Prophylaxis: heparin  CODE Status: DNR  Plan of Care Discussed with: patient/.  Care team.    Patient is critically ill due to septic shock, PHILL, fluid overload with underlying history of pancreatic cancer. Patient is high risk for decompensation.         Carlitos Doherty MD  09/14/19

## 2019-09-14 NOTE — PROGRESS NOTES
Plan from Hospitalist service is to repeat abg at 0600. Will reconsider lasix vs bipap if pt desats.

## 2019-09-14 NOTE — PROGRESS NOTES
Bedside and Verbal shift change report given to Tri WINN (oncoming nurse) by Mary Castaneda (offgoing nurse). Report included the following information SBAR, Kardex, Intake/Output, MAR, Recent Results, Cardiac Rhythm NSR and Alarm Parameters . Patient drowsy, awakens to voice and displays decreased command following. NSR on monitor. Breathing even and unlabored on 8L HFNC, lung sounds clear and diminished. BP stable with levophed infusing (decreased to 28 mcg/min) and vasopressin infusing at 0.01 units/min. Patient anuric/oliguric- nephrology consult pending  Patient too drowsy to take PO pills this time, Dr. Paul Beach aware.

## 2019-09-14 NOTE — CONSULTS
[facility]        Subjective: PHILL  Renal consult dictated # [id number]    Review of Systems -   Unable to obtain due to pt's condition. Objective:    Vitals:    09/14/19 0729 09/14/19 0744 09/14/19 0759 09/14/19 0813   BP: 107/52 98/50 97/53 121/57   Pulse: 87 84 86 86   Resp: 13 10 13 14   Temp:       SpO2: 96% 96% 97% 96%   Weight:       Height:           PE  Gen: somnolent, on double pressors  CV:reg rate  Chest:bilat breath sounds  Abd: soft  Ext/Access: no edema       . LAB  Recent Labs     09/14/19 0211 09/13/19  0324 09/12/19  1907   WBC 28.8* 28.1* 2.8*   HGB 9.6* 9.7* 10.2*   HCT 30.5* 30.9* 33.2*    259 296     Recent Labs     09/14/19  0211 09/13/19  1410 09/13/19  0324 09/12/19  1907    138 138 137   K 3.3* 3.2* 3.5 3.1*   CL 99 100 104 102   CO2 26 22 19* 14*   GLU 77 111* 78 72   BUN 28* 25* 22 18   CREA 3.18* 2.86* 2.50* 2.20*   CA 7.1* 7.3* 7.9* 8.5   ALB  --   --   --  2.5*   TBILI  --   --   --  1.4*   ALT  --   --   --  48   SGOT  --   --   --  109*           Radiology    A/P:   Patient Active Problem List   Diagnosis Code    TIA (transient ischemic attack) G45.9    HTN (hypertension) I10    GERD (gastroesophageal reflux disease) K21.9    Hyponatremia E87.1    Asymptomatic carotid artery stenosis I65.29    Pneumonia J18.9    Overlapping malignant neoplasm of pancreas (HCC) C25.8    Sepsis (HCC) A41.9     PHILL - Labs are consistent with ATN. I discussed case with  and other family members. I will stop her Mobic in this setting. I recommend conservative therapy, and no dialysis given her Pancreatic CA    Grm Neg Sepsis- on Antibiotics.      Shock - on pressors        Sukhdev Sotelo MD

## 2019-09-14 NOTE — PROGRESS NOTES
Pt's breath sounds much clearer. Pt's SPO2 wnl. Pt still requiring High flow O2 and multiple pressors.

## 2019-09-14 NOTE — PROGRESS NOTES
Pt became increasingly agitated, dyspenic. Breath sound coarse. Hospitalist paged. Chest xray obtained, and repeat abg. Xray shows pulmonary edema. Addendum:  Sodium bicarbonate drip stopped.

## 2019-09-14 NOTE — PROGRESS NOTES
Critical Care Daily Progress Note: 9/14/2019    Felicity Gilford   Admission Date: 9/12/2019         The patient's chart is reviewed and the patient is discussed with the staff. Patient is a 66 y.o.  female seen and evaluated at the request of Dr. Mariella Bustamante. The patient is seen in consultation via [facility] protocol with sepsis of unclear etiology. Complaint of diarrhea for several days, hypotension now on levophed, on Vanc, Cefepime. Has renal failure. Levo currently at 4, but BP only 80/40. Patient is alert and awake and states she has pancreatic cancer and is not pursuing treatment, was on hospice, but considering revoking this because her quality of life is very good and didn't need their services. She has been independent, driving, going to play bridge with friends prior to getting ill the past couple days. She confirms DNR, but if able to get better with fairly conservative therapy would like to. She is okay with blood products, IVF, antibiotics and vasopressors as well as central line. She reports chills, but denies cough, sputum, nausea, redness. She has had some bruising, but no other skin changes.           Subjective:     Remains hypotensive on 2 pressors with marginal BP  Family is at bed side    Current Facility-Administered Medications   Medication Dose Route Frequency    NOREPINephrine (LEVOPHED) 8 mg in dextrose 5% 250 mL infusion  0.5-30 mcg/min IntraVENous TITRATE    LORazepam (ATIVAN) injection 0.5 mg  0.5 mg IntraVENous Q2H PRN    [START ON 9/15/2019] cefepime (MAXIPIME) 2 g in 0.9% sodium chloride (MBP/ADV) 100 mL  2 g IntraVENous Q24H    aspirin chewable tablet 81 mg  81 mg Oral DAILY    levothyroxine (SYNTHROID) tablet 75 mcg  75 mcg Oral ACB    calcium carbonate (OS-ESTHELA) tablet 500 mg [elemental]  500 mg Oral DAILY    sodium chloride (NS) flush 5-40 mL  5-40 mL IntraVENous Q8H    sodium chloride (NS) flush 5-40 mL  5-40 mL IntraVENous PRN    acetaminophen (TYLENOL) tablet 650 mg  650 mg Oral Q4H PRN    ondansetron (ZOFRAN) injection 4 mg  4 mg IntraVENous Q4H PRN    HYDROcodone-acetaminophen (NORCO) 5-325 mg per tablet 1 Tab  1 Tab Oral Q4H PRN    heparin (porcine) injection 5,000 Units  5,000 Units SubCUTAneous Q8H    vasopressin (VASOSTRICT) 20 Units in 0.9% sodium chloride 100 mL infusion  0-0.03 Units/min IntraVENous TITRATE    influenza vaccine 2019-20 (6 mos+)(PF) (FLUARIX/FLULAVAL/FLUZONE QUAD) injection 0.5 mL  0.5 mL IntraMUSCular PRIOR TO DISCHARGE    Vancomycin Intermittent Dosing per pharmacy   1,000 mg IntraVENous See Admin Instructions    lidocaine 4 % patch 1 Patch  1 Patch TransDERmal Q24H    morphine injection 2 mg  2 mg IntraVENous Q1H PRN    albuterol (PROVENTIL VENTOLIN) nebulizer solution 2.5 mg  2.5 mg Nebulization Q4H PRN       Review of Systems        Constitutional:  negative for fever, chills, sweats  Cardiovascular:  negative for chest pain, palpitations, syncope, edema  Gastrointestinal:  negative for dysphagia, reflux, vomiting, diarrhea, abdominal pain, or melena  Neurologic:  negative for focal weakness, numbness, headache      Objective:     Vitals:    09/14/19 1208 09/14/19 1218 09/14/19 1224 09/14/19 1229   BP: 113/51 105/55 104/56 95/51   Pulse: 83 85 83 82   Resp: 13 16 24 10   Temp:       SpO2: 96% 90% 93% 94%   Weight:       Height:             Intake/Output Summary (Last 24 hours) at 9/14/2019 1245  Last data filed at 9/14/2019 0252  Gross per 24 hour   Intake 4351.98 ml   Output 15 ml   Net 4336.98 ml       Physical Exam:          Constitutional:  the patient is well developed and in no acute distress  EENMT:  Sclera clear, pupils equal, oral mucosa moist  Respiratory: scattered crackles  Cardiovascular:  RRR without M,G,R  Gastrointestinal: soft and non-tender; with positive bowel sounds. Musculoskeletal: warm without cyanosis. There is 1+ lower extremity edema.   Skin:  no jaundice or rashes, no wounds   Neurologic: no gross neuro deficits     Psychiatric:  Unable to assess    LINES:  Central line  olsen    DRIPS:  Levophed  Vasopressin    CXR:             LAB  No results for input(s): GLUCPOC in the last 72 hours. No lab exists for component: [address]     09/14/19 0211 09/13/19 0324 09/12/19  1907   WBC 28.8* 28.1* 2.8*   HGB 9.6* 9.7* 10.2*   HCT 30.5* 30.9* 33.2*    259 296     Recent Labs     09/14/19 0211 09/13/19  1410 09/13/19  0324 09/12/19  1907    138 138 137   K 3.3* 3.2* 3.5 3.1*   CL 99 100 104 102   CO2 26 22 19* 14*   GLU 77 111* 78 72   BUN 28* 25* 22 18   CREA 3.18* 2.86* 2.50* 2.20*   CA 7.1* 7.3* 7.9* 8.5   ALB  --   --   --  2.5*   TBILI  --   --   --  1.4*   ALT  --   --   --  48   SGOT  --   --   --  109*     Recent Labs     09/14/19  0556 09/13/19  2339 09/13/19  1759   PHI 7.362 7.271* 7.324*   PCO2I 44.1 50.3* 44.2   PO2I 94 78 83   HCO3I 25.0 23.1 23.0     Recent Labs     09/14/19 0211 09/13/19  1410 09/13/19  1005   LAC 4.4* 7.2* 6.1*     No results for input(s): PH, PCO2, PO2, HCO3 in the last 72 hours. Assessment:  (Medical Decision Making)     Hospital Problems  Date Reviewed: 12/4/2017          Codes Class Noted POA    Bacteremia due to Gram-negative bacteria ICD-10-CM: R78.81  ICD-9-CM: 790.7, 041.85  9/14/2019 Yes        Pulmonary infiltrates ICD-10-CM: R91.8  ICD-9-CM: 793.19  9/12/2019 Yes        * (Principal) Septic shock ([facility] ICD-10-CM: A41.9, R65.21  ICD-9-CM: 038.9, 785.52, 995.92  9/12/2019         Overlapping malignant neoplasm of pancreas ([facility] ICD-10-CM: C25.8  ICD-9-CM: 157.8  3/11/2019 Yes              Plan:  (Medical Decision Making)     --restart NS  --cont.  All ABX  --follow cultures  --stress dose IV steroids  --renal following  --discussed above with her family  --very poor prognosis    The patient is critically ill with respiratory failure, circulatory failure and requires high complexity decision making for assessment and support including frequent ventilator adjustment , frequent evaluation and titration of therapies , application of advanced monitoring technologies and extensive interpretation of multiple databases  Time devoted to patient care services described in this note- 15 min face to face/ 20 min total evaluation time    Cumulative time devoted to patient care services by me for day of service -35 min       More than 50% of the time documented was spent in face-to-face contact with the patient and in the care of the patient on the floor/unit where the patient is located.     Dylon Barkley MD

## 2019-09-14 NOTE — CONSULTS
[address] [place] [address] Road    Name:  Jessica Shen  MR#:  [id number]  :  1945  ACCOUNT #:  [de-identified]  DATE OF SERVICE:  2019      NEPHROLOGY CONSULTATION    REASON FOR CONSULTATION:  We are seeing the patient at the request of Dr. Shasha Simeon with regards to acute kidney injury. HISTORY OF PRESENT ILLNESS:  The patient is a 68-year-old  female who was brought to the emergency room at [facility] on 2019. She had been in her usual state of health up until one week prior to admission when she developed a diarrheal illness. On the day of admission, she became progressively more confused and weak and the family became concerned. She was brought to the ER here where she was found to be hypotensive and in acute kidney injury. She was admitted to the hospital and put in the ICU, put on multiple pressors, and cultured. Her blood cultures are apparently growing out Gram-negative rods. We have been asked to see her with regards to acute kidney injury. She has no prior history of renal insufficiency. Her labs prior to this showed that she had normal renal function with a creatinine of about 0.62-0.9. On the day of admission on 2019, she had a BUN of 18, creatinine 2.20. She has remained oliguric, and today, her sodium is 138, potassium 3.3, chloride 99, CO2 is 26, BUN is 28, creatinine is 3.18. Urinalysis on  showed a urine specific gravity 1.010 with only 30 mg/dL of protein with 3-5 WBCs and 0-3 RBCs per high-powered field. Her urine sodium was elevated at 81 with urine creatinine of 69.7. Renal ultrasound was unremarkable with the right kidney measuring 11 cm in length, left kidney measuring 11.7 cm in length. PAST MEDICAL HISTORY:  Significant for pancreatic cancer which was diagnosed about six months ago. She has not undergone any treatment for this and has been on hospice up until the day of admission.   She also has chronic osteoarthritis, hyperlipidemia, chronic hypertension, peripheral vascular disease, left carotid disease, and remote history of TIAs. ALLERGIES:  SHE HAS A HISTORY OF DRUG ALLERGIES TO PENICILLIN AND SULFA. CURRENT MEDICATIONS:  1. Aspirin 81 mg p.o. daily. 2.  Os-Toni 500 mg p.o. daily. 3.  Coreg 6.25 mg p.o. daily. 4.  Maxipime 2 grams IV q.24 hours. 5.  Cyanocobalamin 1000 mcg p.o. daily. 6.  Heparin 5000 units subcutaneous q.8 hours. 7.  Synthroid 75 mcg p.o. daily. 8.  Mobic 7.5 mg p.o. daily. 9.  Ditropan 5 mg p.o. daily. 10.  Crestor 20 mg p.o. daily. 11.  Zanaflex 4 mg p.o. daily. 12.  Vanco per Pharmacy recommendations. 13.  Levophed. 14.  Fernando-Synephrine drip. SOCIAL HISTORY:  She is . Does not smoke. Has an occasional alcoholic beverage. FAMILY HISTORY:  Negative for any kidney disease. REVIEW OF SYSTEMS:  Cannot be obtained due to the patient's current medical condition. PHYSICAL EXAMINATION:  GENERAL:  An elderly, frail, white female who is on pressors and is not really responding to questions. VITAL SIGNS:  Her temperature is 99.3, blood pressure is 121/57, pulse is 86, respirations are 14, they are not labored. HEENT:  Her head is normocephalic. Eyes:  Pupils are equal and react to light and accommodation. Extraocular muscles are intact. Fundi were not visualized. LUNGS:  Clear. She has bilateral breath sounds. HEART:  Regular rate and rhythm. ABDOMEN:  Soft. Bowel sounds are present. There is no hepatosplenomegaly. GENITAL/RECTAL:  Exam was deferred. EXTREMITIES:  She has no peripheral edema. IMPRESSION:  1. Acute kidney injury, oliguric, probably acute tubular necrosis secondary to Gram-negative sepsis and shock. 2.  History of pancreatic cancer. 3.  Remote history of hypertension. 4.  History of osteoarthritis.     PLANS AND RECOMMENDATIONS:  In the setting of pancreatic cancer for which she is not receiving any treatment, I feel that she would not have a long-term benefit from dialytic support if her ATN were to progress. I recommended that she continue with just supportive care she is currently receiving. Thank you very much for allowing us to see her and helping in her care. We will follow with you.       Cliff Holley MD      [id number]  D:  09/14/2019 8:22  T:  09/14/2019 8:33  JOB #:  [id number]  CC:  Dineen Oppenheim, MD Vale Fiscal, MD

## 2019-09-14 NOTE — PROGRESS NOTES
Bedside and Verbal shift change report given to [address] [place] [address] (oncoming nurse) by Darrell Pratt (offgoing nurse). Report included the following information SBAR, Kardex, ED Summary, Intake/Output, MAR, Recent Results, Cardiac Rhythm NSR and Alarm Parameters .     Patient turned   gtts assessed

## 2019-09-15 NOTE — PROGRESS NOTES
[facility]        Subjective: PHILL  Poorly responsive, still on Pitressin drip    Review of Systems -   Unable to obtain due to pt's condition. Objective:    Vitals:    09/15/19 0614 09/15/19 0628 09/15/19 0644 09/15/19 0659   BP: 116/57 119/57 114/56 113/54   Pulse: 83 82 81 82   Resp: 18 18 28 28   Temp:       SpO2: 92% 92% 93% 96%   Weight:       Height:           PE  Gen: somnolent, on double pressors  CV:reg rate  Chest:bilat breath sounds  Abd: soft  Ext/Access: no edema       . LAB  Recent Labs     09/15/19  0402 09/14/19  0211 09/13/19  0324   WBC 33.4* 28.8* 28.1*   HGB 9.1* 9.6* 9.7*   HCT 28.4* 30.5* 30.9*    255 259     Recent Labs     09/15/19  0402 09/14/19  0211 09/13/19  1410  09/12/19  1907    138 138   < > 137   K 4.4 3.3* 3.2*   < > 3.1*    99 100   < > 102   CO2 24 26 22   < > 14*   * 77 111*   < > 72   BUN 41* 28* 25*   < > 18   CREA 3.96* 3.18* 2.86*   < > 2.20*   CA 7.0* 7.1* 7.3*   < > 8.5   ALB  --   --   --   --  2.5*   TBILI  --   --   --   --  1.4*   ALT  --   --   --   --  48   SGOT  --   --   --   --  109*    < > = values in this interval not displayed. Radiology    A/P:   Patient Active Problem List   Diagnosis Code    TIA (transient ischemic attack) G45.9    HTN (hypertension) I10    GERD (gastroesophageal reflux disease) K21.9    Hyponatremia E87.1    Asymptomatic carotid artery stenosis I65.29    Pulmonary infiltrates R91.8    Overlapping malignant neoplasm of pancreas (HCC) C25.8    Septic shock (HCC) A41.9, R65.21    Bacteremia due to Gram-negative bacteria R78.81     PHILL - Labs are consistent with ATN. Oligo-anuric. Labs are getting worse. Jacqualine Galeazzi is not a candidate for dialysis. Recomend Hospice/ Comfort care only  Prognosis is poor. Pancreatic Ca -     Grm Neg Sepsis- on Antibiotics.      Shock - on pressors        Telma Barahona MD

## 2019-09-15 NOTE — PROGRESS NOTES
Critical Care Daily Progress Note: 9/15/2019    Jennifer Garay   Admission Date: 9/12/2019         The patient's chart is reviewed and the patient is discussed with the staff. Patient is a 66 y.o.  female seen and evaluated at the request of Dr. Ifeoma Sierra. The patient is seen in consultation via [facility] protocol with sepsis of unclear etiology. Complaint of diarrhea for several days, hypotension now on levophed, on Vanc, Cefepime. Has renal failure. Levo currently at 4, but BP only 80/40. Patient is alert and awake and states she has pancreatic cancer and is not pursuing treatment, was on hospice, but considering revoking this because her quality of life is very good and didn't need their services. She has been independent, driving, going to play bridge with friends prior to getting ill the past couple days. She confirms DNR, but if able to get better with fairly conservative therapy would like to. She is okay with blood products, IVF, antibiotics and vasopressors as well as central line. She reports chills, but denies cough, sputum, nausea, redness. She has had some bruising, but no other skin changes.           Subjective:     Remains hypotensive on vasopressin  Family is at bed side  Very lethargic    Current Facility-Administered Medications   Medication Dose Route Frequency    [START ON 9/16/2019] cefepime (MAXIPIME) 1 g in 0.9% sodium chloride (MBP/ADV) 50 mL  1 g IntraVENous Q24H    NOREPINephrine (LEVOPHED) 8 mg in dextrose 5% 250 mL infusion  0.5-30 mcg/min IntraVENous TITRATE    LORazepam (ATIVAN) injection 0.5 mg  0.5 mg IntraVENous Q2H PRN    0.9% sodium chloride infusion  50 mL/hr IntraVENous CONTINUOUS    hydrocortisone Sod Succ (PF) (SOLU-CORTEF) injection 50 mg  50 mg IntraVENous Q6H    aspirin chewable tablet 81 mg  81 mg Oral DAILY    levothyroxine (SYNTHROID) tablet 75 mcg  75 mcg Oral ACB    calcium carbonate (OS-ESTHELA) tablet 500 mg [elemental]  500 mg Oral DAILY  sodium chloride (NS) flush 5-40 mL  5-40 mL IntraVENous Q8H    sodium chloride (NS) flush 5-40 mL  5-40 mL IntraVENous PRN    acetaminophen (TYLENOL) tablet 650 mg  650 mg Oral Q4H PRN    ondansetron (ZOFRAN) injection 4 mg  4 mg IntraVENous Q4H PRN    HYDROcodone-acetaminophen (NORCO) 5-325 mg per tablet 1 Tab  1 Tab Oral Q4H PRN    heparin (porcine) injection 5,000 Units  5,000 Units SubCUTAneous Q8H    vasopressin (VASOSTRICT) 20 Units in 0.9% sodium chloride 100 mL infusion  0-0.03 Units/min IntraVENous TITRATE    influenza vaccine 2019-20 (6 mos+)(PF) (FLUARIX/FLULAVAL/FLUZONE QUAD) injection 0.5 mL  0.5 mL IntraMUSCular PRIOR TO DISCHARGE    Vancomycin Intermittent Dosing per pharmacy   1,000 mg IntraVENous See Admin Instructions    lidocaine 4 % patch 1 Patch  1 Patch TransDERmal Q24H    morphine injection 2 mg  2 mg IntraVENous Q1H PRN    albuterol (PROVENTIL VENTOLIN) nebulizer solution 2.5 mg  2.5 mg Nebulization Q4H PRN       Review of Systems        Constitutional:  negative for fever, chills, sweats  Cardiovascular:  negative for chest pain, palpitations, syncope, edema  Gastrointestinal:  negative for dysphagia, reflux, vomiting, diarrhea, abdominal pain, or melena  Neurologic:  negative for focal weakness, numbness, headache      Objective:     Vitals:    09/15/19 1128 09/15/19 1143 09/15/19 1159 09/15/19 1214   BP: 98/57 93/52 97/54 94/52   Pulse: 88 80 80 79   Resp: 19 15 13 9   Temp:    97.6 °F (36.4 °C)   SpO2: 98% 98% 98% 98%   Weight:       Height:             Intake/Output Summary (Last 24 hours) at 9/15/2019 1500  Last data filed at 9/15/2019 7842  Gross per 24 hour   Intake 2007.27 ml   Output 35 ml   Net 1972.27 ml       Physical Exam:          Constitutional:  the patient is well developed and in no acute distress  EENMT:  Sclera clear, pupils equal, oral mucosa moist  Respiratory: scattered crackles  Cardiovascular:  RRR without M,G,R  Gastrointestinal: soft and non-tender; with positive bowel sounds. Musculoskeletal: warm without cyanosis. There is 1+ lower extremity edema. Skin:  no jaundice or rashes, no wounds   Neurologic: no gross neuro deficits     Psychiatric:  Unable to assess    LINES:  Central line  olsen    DRIPS:    Vasopressin    CXR:             LAB  No results for input(s): GLUCPOC in the last 72 hours. No lab exists for component: [place]   Recent Labs     09/15/19  0402 09/14/19  0211 09/13/19  0324   WBC 33.4* 28.8* 28.1*   HGB 9.1* 9.6* 9.7*   HCT 28.4* 30.5* 30.9*    255 259     Recent Labs     09/15/19  0402 09/14/19  0211 09/13/19  1410  09/12/19  1907    138 138   < > 137   K 4.4 3.3* 3.2*   < > 3.1*    99 100   < > 102   CO2 24 26 22   < > 14*   * 77 111*   < > 72   BUN 41* 28* 25*   < > 18   CREA 3.96* 3.18* 2.86*   < > 2.20*   CA 7.0* 7.1* 7.3*   < > 8.5   ALB  --   --   --   --  2.5*   TBILI  --   --   --   --  1.4*   ALT  --   --   --   --  48   SGOT  --   --   --   --  109*    < > = values in this interval not displayed. Recent Labs     09/14/19  0556 09/13/19  2339 09/13/19  1759   PHI 7.362 7.271* 7.324*   PCO2I 44.1 50.3* 44.2   PO2I 94 78 83   HCO3I 25.0 23.1 23.0     Recent Labs     09/15/19  0402 09/14/19  0211 09/13/19  1410   LAC 1.1 4.4* 7.2*     No results for input(s): PH, PCO2, PO2, HCO3 in the last 72 hours. Assessment:  (Medical Decision Making)     Hospital Problems  Date Reviewed: 12/4/2017          Codes Class Noted POA    Bacteremia due to Gram-negative bacteria ICD-10-CM: R78.81  ICD-9-CM: 790.7, 041.85  9/14/2019 Yes        Pulmonary infiltrates ICD-10-CM: R91.8  ICD-9-CM: 793.19  9/12/2019 Yes        * (Principal) Septic shock ([facility] ICD-10-CM: A41.9, R65.21  ICD-9-CM: 038.9, 785.52, 995.92  9/12/2019         Overlapping malignant neoplasm of pancreas ([facility] ICD-10-CM: C25.8  ICD-9-CM: 157.8  3/11/2019 Yes              Plan:  (Medical Decision Making)       --cont.  All ABX  --follow cultures  -- IV steroids  --renal following  --discussed above with her family  --very poor prognosis  --PC following    The patient is critically ill with respiratory failure, circulatory failure and requires high complexity decision making for assessment and support including frequent ventilator adjustment , frequent evaluation and titration of therapies , application of advanced monitoring technologies and extensive interpretation of multiple databases  Time devoted to patient care services described in this note- 15 min face to face/ 20 min total evaluation time    Cumulative time devoted to patient care services by me for day of service -35 min       More than 50% of the time documented was spent in face-to-face contact with the patient and in the care of the patient on the floor/unit where the patient is located.     Eb Lombardi MD

## 2019-09-15 NOTE — PROGRESS NOTES
Hospitalist Progress Note    9/15/2019  Admit Date: 2019  6:58 PM   NAME: Vik Walter   :  1945   DOS:              09/15/19  MRN:  [id number]   Attending: Ijeoma Hager MD  PCP:  Michelle Sands MD  Treatment Team: Attending Provider: Patrick Kim MD; Consulting Provider: Tori Gamble MD; Utilization Review: Zayra Quigley RN; Care Manager: Brii Sol RN; Consulting Provider: Kody Subramanian NP; Consulting Provider: Edward Cotto MD; Primary Nurse: Patricia St RN    DNR     SUBJECTIVE:   As previously documented:  77yoF with pancreatic cancer on home hospice who presents to the ER for confusion and weakness. She has been having diarrhea for about 1 week. In the ED patient was hypotensive that required pressors support. Patient started on IV vanco/cefepime for sepsis ? PNA vs gastroenteritis. Blood cultures 1/2 with GNRs. Patient having PHILL, nephrology following.       09/15/19    Vik Walter is pleasantly confused lying in the bed. Family at bedside reported will discuss with  about comfort measures. Nurse reported  will have final decision about goals of care tomorrow. 10+ ROS reviewed and negative except for positive in HPI.    Allergies   Allergen Reactions    Penicillins Unknown (comments)    Sulfa (Sulfonamide Antibiotics) Hives     Current Facility-Administered Medications   Medication Dose Route Frequency    [START ON 2019] cefepime (MAXIPIME) 1 g in 0.9% sodium chloride (MBP/ADV) 50 mL  1 g IntraVENous Q24H    NOREPINephrine (LEVOPHED) 8 mg in dextrose 5% 250 mL infusion  0.5-30 mcg/min IntraVENous TITRATE    LORazepam (ATIVAN) injection 0.5 mg  0.5 mg IntraVENous Q2H PRN    0.9% sodium chloride infusion  50 mL/hr IntraVENous CONTINUOUS    hydrocortisone Sod Succ (PF) (SOLU-CORTEF) injection 50 mg  50 mg IntraVENous Q6H    aspirin chewable tablet 81 mg  81 mg Oral DAILY    levothyroxine (SYNTHROID) tablet 75 mcg  75 mcg Oral ACB    calcium carbonate (OS-ESTHELA) tablet 500 mg [elemental]  500 mg Oral DAILY    sodium chloride (NS) flush 5-40 mL  5-40 mL IntraVENous Q8H    sodium chloride (NS) flush 5-40 mL  5-40 mL IntraVENous PRN    acetaminophen (TYLENOL) tablet 650 mg  650 mg Oral Q4H PRN    ondansetron (ZOFRAN) injection 4 mg  4 mg IntraVENous Q4H PRN    HYDROcodone-acetaminophen (NORCO) 5-325 mg per tablet 1 Tab  1 Tab Oral Q4H PRN    heparin (porcine) injection 5,000 Units  5,000 Units SubCUTAneous Q8H    vasopressin (VASOSTRICT) 20 Units in 0.9% sodium chloride 100 mL infusion  0-0.03 Units/min IntraVENous TITRATE    influenza vaccine 2019-20 (6 mos+)(PF) (FLUARIX/FLULAVAL/FLUZONE QUAD) injection 0.5 mL  0.5 mL IntraMUSCular PRIOR TO DISCHARGE    Vancomycin Intermittent Dosing per pharmacy   1,000 mg IntraVENous See Admin Instructions    lidocaine 4 % patch 1 Patch  1 Patch TransDERmal Q24H    morphine injection 2 mg  2 mg IntraVENous Q1H PRN    albuterol (PROVENTIL VENTOLIN) nebulizer solution 2.5 mg  2.5 mg Nebulization Q4H PRN         There is no immunization history for the selected administration types on file for this patient.   Objective:     Patient Vitals for the past 24 hrs:   Temp Pulse Resp BP SpO2   09/15/19 1214 97.6 °F (36.4 °C) 79 9 94/52 98 %   09/15/19 1159  80 13 97/54 98 %   09/15/19 1143  80 15 93/52 98 %   09/15/19 1128  88 19 98/57 98 %   09/15/19 1114  83 8 96/51 98 %   09/15/19 1059  84 10 99/54 98 %   09/15/19 1043  84 12 99/56 98 %   09/15/19 1028  85 13 111/54 98 %   09/15/19 1014  84 12 102/59 99 %   09/15/19 0959  85 16 115/60 100 %   09/15/19 0943  85 16 104/56 100 %   09/15/19 0914  84 12 113/58 100 %   09/15/19 0859  85 18 111/57 100 %   09/15/19 0844  84 19 129/58 99 %   09/15/19 0828  89 24 122/72 100 %   09/15/19 0814  85 29 116/58 100 %   09/15/19 0759 97.4 °F (36.3 °C) 85 20 111/59 100 %   09/15/19 0750     100 %   09/15/19 0744  85  103/58 100 %   09/15/19 0728  86 13 103/67 100 %   09/15/19 0715  82 19 104/59 95 %   09/15/19 0659  82 28 113/54 96 %   09/15/19 0644  81 28 114/56 93 %   09/15/19 0628  82 18 119/57 92 %   09/15/19 0614  83 18 116/57 92 %   09/15/19 0559  84 19 98/55 91 %   09/15/19 0505  84 14 113/53 94 %   09/15/19 0433  85 22 148/70 95 %   09/15/19 0414  85 14 126/54 95 %   09/15/19 0359  84 24 142/65 95 %   09/15/19 0344  88 (!) 48 146/70 96 %   09/15/19 0328  87 21 138/64 96 %   09/15/19 0314  86 24 119/62 95 %   09/15/19 0259 99.2 °F (37.3 °C) 86 23 122/62 95 %   09/15/19 0244  84 17 112/59 94 %   09/15/19 0228  85 (!) 40 114/58 94 %   09/15/19 0214  82 16 108/56 94 %   09/15/19 0159  85 19 121/62 96 %   09/15/19 0145  85 25 117/58 96 %   09/15/19 0128  83 28 126/61 96 %   09/15/19 0113  84 (!) 38 121/59 97 %   09/15/19 0100  84 24 115/64 97 %   09/15/19 0044  82 23 126/60 97 %   09/15/19 0029  85 25 121/60 97 %   09/15/19 0013  83 23 108/55 95 %   09/14/19 2359  82 22 118/55 96 %   09/14/19 2344  86 21 119/57 97 %   09/14/19 2329  89 18 127/61 96 %   09/14/19 2313  85 10 117/58 95 %   09/14/19 2259 99.2 °F (37.3 °C) 83 10 107/57 95 %   09/14/19 2244  83 10 102/57 96 %   09/14/19 2228  84 24 102/58 96 %   09/14/19 2213  84 30 102/52 95 %   09/14/19 2159  83 16 97/55 95 %   09/14/19 2113  82 22 105/57 96 %   09/14/19 2059  81 (!) 33 112/56 97 %   09/14/19 2044  84 27 104/55 97 %   09/14/19 2028  83 20 93/55 96 %   09/14/19 2013  83 18 91/54 97 %   09/14/19 1959  84 23 93/54 97 %   09/14/19 1944  84 20 104/55 98 %   09/14/19 1928  86 19 106/56 98 %   09/14/19 1913  90 19 97/54 97 %   09/14/19 1859 98.9 °F (37.2 °C) 84 21 110/59 99 %   09/14/19 1844  84 16 107/70 99 %   09/14/19 1828  81 22 104/58 98 %   09/14/19 1813  81 19 108/58 98 %   09/14/19 1759  82 20 100/58 95 %   09/14/19 1744  81 19 94/53 95 %   09/14/19 1728  81 23 91/50 92 %   09/14/19 1716  82 22 (!) 86/51 91 % 19 1704  83 16 (!) 83/49 96 %   19 1644  86 10 99/56 98 %   19 1628 98.8 °F (37.1 °C) 86 11 101/56 97 %   19 1613  86 10 109/55 98 %   19 1559  85 10 96/55 96 %   19 1545  86 12  96 %   19 1544  86  103/56 96 %   19 1528  85  100/58 96 %   19 1527  85 11  96 %   19 1513  86 11 102/58 96 %   19 1459  87 12 110/62 96 %   19 1444  87 12 109/57 96 %   19 1428  85 9 102/58 96 %   19 1413  85 10 104/52 95 %   19 1359  85  115/53 95 %   19 1358  85 12  95 %   19 1344  84 10 94/53 96 %   19 1329  85 11 106/55 95 %   19 1328  85 12  95 %   19 1313  84 11 108/51 94 %   19 1259  84 11 92/51 94 %   19 1244 99.1 °F (37.3 °C) 83 12 93/51 95 %     Temp (24hrs), Av.6 °F (37 °C), Min:97.4 °F (36.3 °C), Max:99.2 °F (37.3 °C)    Oxygen Therapy  O2 Sat (%): 98 % (09/15/19 121)  Pulse via Oximetry: 81 beats per minute (09/15/19 1214)  O2 Device: Hi flow nasal cannula (09/15/19 0750)  O2 Flow Rate (L/min): 6 l/min(weaned to 6lhfnc) (09/15/19 075)  Oxygen Therapy  O2 Sat (%): 98 % (09/15/19 1214)  Pulse via Oximetry: 81 beats per minute (09/15/19 1214)  O2 Device: Hi flow nasal cannula (09/15/19 0750)  O2 Flow Rate (L/min): 6 l/min(weaned to 6lhfnc) (09/15/19 0750)    Physical Exam:  General:         Alert, cooperative, no distress   HEENT:               Nares normal. No drainage  Lungs: No wheezing/rhonchi/rales  Cardiovascular:   RRR. No m/r/g. No pedal edema b/l. Abdomen:       Mid abdomen tenderness no guarding or rebound. (+) BS  Skin:         No rashes or lesions. Not Jaundiced  Neurologic:     No gross focal deficit. Psychiatric:         Good mood. Normal affect.               DIAGNOSTIC STUDIES      Data Review:   Recent Results (from the past 24 hour(s))   CBC WITH AUTOMATED DIFF    Collection Time: 09/15/19  4:02 AM   Result Value Ref Range WBC 33.4 (H) 4.3 - 11.1 K/uL    RBC 3.22 (L) 4.05 - 5.2 M/uL    HGB 9.1 (L) 11.7 - 15.4 g/dL    HCT 28.4 (L) 35.8 - 46.3 %    MCV 88.2 79.6 - 97.8 FL    MCH 28.3 26.1 - 32.9 PG    MCHC 32.0 31.4 - 35.0 g/dL    RDW 14.0 11.9 - 14.6 %    PLATELET 156 362 - 579 K/uL    MPV 10.4 9.4 - 12.3 FL    ABSOLUTE NRBC 0.00 0.0 - 0.2 K/uL    NEUTROPHILS 86 (H) 47 - 75 %    BAND NEUTROPHILS 10 0 - 10 %    MONOCYTES 3 3 - 9 %    METAMYELOCYTES 1 %    ABS. NEUTROPHILS 32.4 (H) 1.7 - 8.2 K/UL    ABS. MONOCYTES 1.0 0.1 - 1.3 K/UL    RBC COMMENTS SLIGHT  ANISOCYTOSIS + POIKILOCYTOSIS        RBC COMMENTS SLIGHT  HYPOCHROMIA        WBC COMMENTS DOHLE BODIES      PLATELET COMMENTS ADEQUATE      DF MANUAL     METABOLIC PANEL, BASIC    Collection Time: 09/15/19  4:02 AM   Result Value Ref Range    Sodium 136 136 - 145 mmol/L    Potassium 4.4 3.5 - 5.1 mmol/L    Chloride 100 98 - 107 mmol/L    CO2 24 21 - 32 mmol/L    Anion gap 12 7 - 16 mmol/L    Glucose 114 (H) 65 - 100 mg/dL    BUN 41 (H) 8 - 23 MG/DL    Creatinine 3.96 (H) 0.6 - 1.0 MG/DL    GFR est AA 14 (L) >60 ml/min/1.73m2    GFR est non-AA 12 (L) >60 ml/min/1.73m2    Calcium 7.0 (L) 8.3 - 10.4 MG/DL   LACTIC ACID    Collection Time: 09/15/19  4:02 AM   Result Value Ref Range    Lactic acid 1.1 0.4 - 2.0 MMOL/L       All Micro Results     Procedure Component Value Units Date/Time    CULTURE, BLOOD [[id number]]  (Abnormal) Collected:  09/12/19 6877    Order Status:  Completed Specimen:  Blood Updated:  09/15/19 5019     Special Requests: --        LEFT  HAND       GRAM STAIN GRAM NEGATIVE RODS         AEROBIC BOTTLE POSITIVE               RESULTS VERIFIED, PHONED TO AND READ BACK BY Maci Ash @1800 ON 9/14 AK.            Culture result: GRAM NEGATIVE RODS         REFER TO giddyE PANEL ACC [id number]            IDENTIFICATION AND SUSCEPTIBILITY TO FOLLOW          CULTURE, BLOOD [[id number]] Collected:  09/14/19 1048    Order Status:  Completed Specimen:  Blood Updated:  09/15/19 5178     Special Requests: CENTRAL        Culture result: NO GROWTH AFTER 17 HOURS       CULTURE, BLOOD [[id number]] Collected:  09/12/19 1922    Order Status:  Completed Specimen:  Blood Updated:  09/15/19 0553     Special Requests: --        RIGHT  FOREARM       Culture result: NO GROWTH 3 DAYS       CULTURE, BLOOD [[id number]]     Order Status:  Canceled Specimen:  Blood     BLOOD CULTURE ID PANEL [[id number]]  (Abnormal) Collected:  09/12/19 1907    Order Status:  Completed Specimen:  Blood Updated:  09/14/19 0746     Acc. no. from Micro Order [id number]     Klebsiella pneumoniae DETECTED        KPC (Carbapenem Resistance Gene) NOT DETECTED        Comment: WARNING:  A Not Detected result for the KPC gene does not indicate susceptibility to carbapenems. Gram negative bacteria can be resistant to carbapenems by mechanisms other than carrying the KPC gene. C. DIFFICILE AG & TOXIN A/B [[id number]] Collected:  09/13/19 0545    Order Status:  Completed Specimen:  Stool Updated:  09/13/19 1154     [address] [place] ANTIGEN       C. DIFFICILE GDH ANTIGEN-NEGATIVE           C. difficile toxin       C. DIFFICILE TOXIN-NEGATIVE           PCR Reflex NOT APPLICABLE        INTERPRETATION       NEGATIVE FOR TOXIGENIC C. DIFFICILE           Clinical Consideration       NEGATIVE FOR TOXIGENIC C. DIFFICILE                Imaging Artist Marin /Studies:    CXR Results  (Last 48 hours)               09/13/19 2327  XR CHEST SNGL V Final result    Impression:  IMPRESSION: Acute interstitial pulmonary edema. Narrative:  EXAM: XR CHEST SNGL V       INDICATION: ? Fluid overload. COMPARISON: 9/13/2019       FINDINGS: A portable AP radiograph of the chest was obtained at 2322 hours. The   patient is on a cardiac monitor. Diffuse increased interstitial markings worse   in the interval. Unremarkable central venous catheter. . The cardiac and   mediastinal contours and pulmonary vascularity are normal.  The bones and soft   tissues are grossly within normal limits. CT Results  (Last 48 hours)    None        No results found. No results found for this visit on 09/12/19. Labs and Studies from previous 24 hours have been personally reviewed by myself Nikolas Sorenson 96 Problems    Diagnosis Date Noted    Bacteremia due to Gram-negative bacteria 09/14/2019    Pulmonary infiltrates 09/12/2019    Septic shock (HCC) 09/12/2019    [facility] 03/11/2019     Hospital Problems as of 9/15/2019 Date Reviewed: 12/4/2017          Codes Class Noted - Resolved POA    Bacteremia due to Gram-negative bacteria ICD-10-CM: R78.81  ICD-9-CM: 790.7, 041.85  9/14/2019 - Present Yes        Pulmonary infiltrates ICD-10-CM: R91.8  ICD-9-CM: 793.19  9/12/2019 - Present Yes        * (Principal) Septic shock ([facility] ICD-10-CM: A41.9, R65.21  ICD-9-CM: 038.9, 785.52, 995.92  9/12/2019 - Present         Overlapping malignant neoplasm of pancreas (HCC) ICD-10-CM: C25.8  ICD-9-CM: 157.8  3/11/2019 - Present Yes        HTN (hypertension) ICD-10-CM: I10  ICD-9-CM: 401.9  1/7/2015 - Present               A/P:    -Septic shock  Could be secondary to PNA vs gastroenteritis  Lactic acidosis resolved  Pulm following  BC with GNRs, repeated pending  Cont vancomycin and cefepime day #3  Patient on vasopressin only. Keep weaning pressors as tolerated  Goals of care dicussed at bedside.  will take decision tomorrow morning    -Pancreatic CA  Palliative care eval appreciated  Cont supportive care with pain medications    -PHILL  Likely related to ATN due to hypotension  Baseline 0.67 2015. Renal function is worsening  Nephrology eval recommended supportive care  Strict I&O    -Fluid overload  O2 demand stable  CXR with interstitial pulm edema  Elevated BNP to 4,000. Not a candidate for HD  Will monitor for now.  May need diuresis when more stable    Planning to start tube feeding tomorrow if family wants to continue treatment. DVT Prophylaxis: heparin  CODE Status: DNR      Patient is critically ill due to septic shock, PHILL, fluid overload with underlying history of pancreatic cancer. Patient is high risk for decompensation.     Critical care time spent : 45minutes        Michelle Fulton MD  09/15/19

## 2019-09-16 PROBLEM — C25.8 OVERLAPPING MALIGNANT NEOPLASM OF PANCREAS (HCC): Chronic | Status: ACTIVE | Noted: 2019-01-01

## 2019-09-16 PROBLEM — R78.81 BACTEREMIA: Status: ACTIVE | Noted: 2019-01-01

## 2019-09-16 NOTE — PROGRESS NOTES
Bedside shift change report given to Raquel Simpson RN (oncoming nurse) by Lana Young RN (offgoing nurse). Report included the following information SBAR, Kardex, Intake/Output, Recent Results, Med Rec Status and Cardiac Rhythm NSR.

## 2019-09-16 NOTE — HOSPICE
Spoke with Dr. Joseph Coto and he has requested that a bereavement support request for the spouse be initiated. Patient has been moved to comfort measures. I have notified [address] bereavement support to contact this family.  Thank you for this referral.  Rafael Ware, RN, BSN  Community Nurse Liaison  [facility]  [phone]

## 2019-09-16 NOTE — PROGRESS NOTES
END OF SHIFT NOTE:    Intake/Output  09/16 0701 - 09/16 1900  In: 375 [I.V.:375]  Out: -    Voiding: YES  Catheter: YES  Drain:              Stool:  1 occurrences. Stool Assessment  Stool Color: Green (09/16/19 1054)  Stool Appearance: Watery (09/16/19 1054)  Stool Amount: Large (09/16/19 1054)  Stool Source/Status: Rectum; Incontinence (09/16/19 1054)    Emesis:  0 occurrences. VITAL SIGNS  Patient Vitals for the past 12 hrs:   Temp Pulse Resp BP SpO2   09/16/19 1534 98.4 °F (36.9 °C) 76 14 109/69 96 %   09/16/19 1458  85 14 120/62 98 %   09/16/19 1358  86 12 120/61 98 %   09/16/19 1258  85 12 117/63 98 %   09/16/19 1248  86 23  97 %   09/16/19 1212  86 10  98 %   09/16/19 1158 98 °F (36.7 °C) 86 11 111/62 98 %   09/16/19 1135  87 14  98 %   09/16/19 1122  86 21  98 %   09/16/19 1058  90 11 132/65 97 %   09/16/19 1053  95 18  98 %   09/16/19 1029  90 20 127/71 97 %   09/16/19 1000  89 16  97 %   09/16/19 0958  89 12 118/65 97 %   09/16/19 0957  87 15  97 %   09/16/19 0929  87 24 135/72 99 %   09/16/19 0925     99 %   09/16/19 0858  84 23 134/72 100 %   09/16/19 0829  82 17 141/73 100 %   09/16/19 0758  80 15 130/68 100 %   09/16/19 0729 97.8 °F (36.6 °C) 80 26 119/66 100 %   09/16/19 0658  79 24 122/62 100 %   09/16/19 0629  80 26 109/63 100 %   09/16/19 0558  78 (!) 39 105/56 100 %       Pain Assessment  Pain 1  Pain Scale 1: Adult Nonverbal Pain Scale (09/16/19 1158)  Pain Intensity 1: 0 (09/16/19 1158)  Patient Stated Pain Goal: Unable to verbalize/indicate pain (09/16/19 1158)  Pain Reassessment 1: Yes (09/15/19 0959)  Pain Onset 1: chronic (09/13/19 2213)  Pain Location 1: Abdomen(Pt points to abdomen) (09/13/19 2213)  Pain Orientation 1: Left (09/13/19 2213)  Pain Description 1: (Pt does not verbalize.) (09/13/19 2213)  Pain Intervention(s) 1: Medication (see MAR) (09/15/19 0925)    Ambulating  No    Additional Information: Pt resting comfortably in bed.      Shift report will be given to oncoming nurse at the bedside.     Sandy Garvey RN

## 2019-09-16 NOTE — PROGRESS NOTES
Bedside shift change report given to Skippy Mcburney, RN (oncoming nurse) by PA Nettles (offgoing nurse). Report included the following information SBAR, Kardex, Intake/Output, Recent Results, Med Rec Status and Cardiac Rhythm NSR.

## 2019-09-16 NOTE — PROGRESS NOTES
Pharmacokinetic Consult to Pharmacist    Vik Jose Angel is a 66 y.o. female being treated for septic shock/HAP with vancomycin and cefepime. Height: 5' 1.5\" (156.2 cm)  Weight: 64.5 kg (142 lb 3.2 oz)  Lab Results   Component Value Date/Time    BUN 61 (H) 09/16/2019 03:27 AM    Creatinine 4.75 (H) 09/16/2019 03:27 AM    WBC 36.0 (H) 09/16/2019 03:27 AM    Procalcitonin 31.5 09/12/2019 07:07 PM    Lactic acid 1.1 09/15/2019 04:02 AM    Lactic Acid (POC) 5.58 (H) 09/13/2019 12:32 AM      Estimated Creatinine Clearance: 8.8 mL/min (A) (based on SCr of 4.75 mg/dL (H)). CULTURES:  9/12 BCx: Klebsiella    Day 4 of vancomycin. Goal trough is 15 -20. Vancomycin dose initiated at 1.5 g x1 on 9/13, then 1 g x1 on 9/14. Level today is supratherapuetic at 22.5 mcg/ml, therefore will hold vancomycin. Cefepime dose also adjusted for renal function. Will continue to follow patient.       Thank you,  Oscar Ash, PharmD

## 2019-09-16 NOTE — PROGRESS NOTES
09/16/19 1535   Dual Skin Pressure Injury Assessment   Dual Skin Pressure Injury Assessment WDL   Second Care Provider (Based on [address] [place] [address]) orly ennis rn   Skin Integumentary   Skin Integumentary (WDL) X   Skin Color Pale   Skin Condition/Temp Dry; Warm   Skin Integrity Tear  (left arm)   Turgor Epidermis thin w/ loss of subcut tissue   Hair Growth Present   Varicosities Absent    Pressure  Injury Documentation No Pressure Injury Noted-Pressure Ulcer Prevention Initiated   Wound Prevention and Protection Methods   Orientation of Wound Prevention Posterior   Location of Wound Prevention Sacrum/Coccyx   Dressing Present  Yes   Dressing Status Intact   Wound Offloading (Prevention Methods) Bed, pressure redistribution/air;Bed, pressure reduction mattress; Foam silicone   Transfer from CCU. Pt comfort care, and is unresponsive. Pt has a skin tear on the left arm and an allevyn on sacrum.

## 2019-09-16 NOTE — PROGRESS NOTES
Renal Progress Note    Admission Date: 9/12/2019   Subjective:      Comfortable     Objective:     Physical Exam:    Patient Vitals for the past 8 hrs:   BP Temp Pulse Resp SpO2 Weight   09/16/19 0858 134/72  84 23 100 %    09/16/19 0829 141/73  82 17 100 %    09/16/19 0758 130/68  80 15 100 %    09/16/19 0729 119/66 97.8 °F (36.6 °C) 80 26 100 %    09/16/19 0658 122/62  79 24 100 %    09/16/19 0629 109/63  80 26 100 %    09/16/19 0558 105/56  78 (!) 39 100 %    09/16/19 0530 158/84  85 28 100 %    09/16/19 0429 106/55  78 25 99 %    09/16/19 0321      64.5 kg (142 lb 3.2 oz)   09/16/19 0258 102/55 97.9 °F (36.6 °C) 80 17 100 %       Gen: comfortable , NAD  HEENT: Dry membranes  CV: S1, S2  Lungs: Clear bilaterally  Extem: no edema     Current Facility-Administered Medications   Medication Dose Route Frequency    cefepime (MAXIPIME) 1 g in 0.9% sodium chloride (MBP/ADV) 50 mL  1 g IntraVENous Q24H    NOREPINephrine (LEVOPHED) 8 mg in dextrose 5% 250 mL infusion  0.5-30 mcg/min IntraVENous TITRATE    LORazepam (ATIVAN) injection 0.5 mg  0.5 mg IntraVENous Q2H PRN    0.9% sodium chloride infusion  50 mL/hr IntraVENous CONTINUOUS    hydrocortisone Sod Succ (PF) (SOLU-CORTEF) injection 50 mg  50 mg IntraVENous Q6H    aspirin chewable tablet 81 mg  81 mg Oral DAILY    levothyroxine (SYNTHROID) tablet 75 mcg  75 mcg Oral ACB    sodium chloride (NS) flush 5-40 mL  5-40 mL IntraVENous Q8H    sodium chloride (NS) flush 5-40 mL  5-40 mL IntraVENous PRN    acetaminophen (TYLENOL) tablet 650 mg  650 mg Oral Q4H PRN    ondansetron (ZOFRAN) injection 4 mg  4 mg IntraVENous Q4H PRN    HYDROcodone-acetaminophen (NORCO) 5-325 mg per tablet 1 Tab  1 Tab Oral Q4H PRN    heparin (porcine) injection 5,000 Units  5,000 Units SubCUTAneous Q8H    vasopressin (VASOSTRICT) 20 Units in 0.9% sodium chloride 100 mL infusion  0-0.03 Units/min IntraVENous TITRATE    influenza vaccine 2019-20 (6 mos+)(PF) (FLUARIX/FLULAVAL/FLUZONE QUAD) injection 0.5 mL  0.5 mL IntraMUSCular PRIOR TO DISCHARGE    Vancomycin Intermittent Dosing per pharmacy   1,000 mg IntraVENous See Admin Instructions    lidocaine 4 % patch 1 Patch  1 Patch TransDERmal Q24H    morphine injection 2 mg  2 mg IntraVENous Q1H PRN    albuterol (PROVENTIL VENTOLIN) nebulizer solution 2.5 mg  2.5 mg Nebulization Q4H PRN            Data Review:     LABS:   Recent Results (from the past 12 hour(s))   VANCOMYCIN, RANDOM    Collection Time: 09/16/19  3:27 AM   Result Value Ref Range    Vancomycin, random 22.5 UG/ML   CBC WITH AUTOMATED DIFF    Collection Time: 09/16/19  3:27 AM   Result Value Ref Range    WBC 36.0 (H) 4.3 - 11.1 K/uL    RBC 3.18 (L) 4.05 - 5.2 M/uL    HGB 8.9 (L) 11.7 - 15.4 g/dL    HCT 27.7 (L) 35.8 - 46.3 %    MCV 87.1 79.6 - 97.8 FL    MCH 28.0 26.1 - 32.9 PG    MCHC 32.1 31.4 - 35.0 g/dL    RDW 14.2 11.9 - 14.6 %    PLATELET 949 929 - 985 K/uL    MPV 10.4 9.4 - 12.3 FL    ABSOLUTE NRBC 0.00 0.0 - 0.2 K/uL    DF AUTOMATED      NEUTROPHILS 90 (H) 43 - 78 %    LYMPHOCYTES 2 (L) 13 - 44 %    MONOCYTES 4 4.0 - 12.0 %    EOSINOPHILS 0 (L) 0.5 - 7.8 %    BASOPHILS 0 0.0 - 2.0 %    IMMATURE GRANULOCYTES 4 0.0 - 5.0 %    ABS. NEUTROPHILS 32.3 (H) 1.7 - 8.2 K/UL    ABS. LYMPHOCYTES 0.7 0.5 - 4.6 K/UL    ABS. MONOCYTES 1.4 (H) 0.1 - 1.3 K/UL    ABS. EOSINOPHILS 0.1 0.0 - 0.8 K/UL    ABS. BASOPHILS 0.2 0.0 - 0.2 K/UL    ABS. IMM. GRANS.  1.4 (H) 0.0 - 0.5 K/UL   METABOLIC PANEL, BASIC    Collection Time: 09/16/19  3:27 AM   Result Value Ref Range    Sodium 139 136 - 145 mmol/L    Potassium 3.9 3.5 - 5.1 mmol/L    Chloride 103 98 - 107 mmol/L    CO2 23 21 - 32 mmol/L    Anion gap 13 7 - 16 mmol/L    Glucose 90 65 - 100 mg/dL    BUN 61 (H) 8 - 23 MG/DL    Creatinine 4.75 (H) 0.6 - 1.0 MG/DL    GFR est AA 11 (L) >60 ml/min/1.73m2    GFR est non-AA 9 (L) >60 ml/min/1.73m2    Calcium 6.9 (L) 8.3 - 10.4 MG/DL         Plan:     Principal Problem:    Septic shock ([facility] [address] (9/12/2019)    Active Problems:    Pulmonary infiltrates (9/12/2019)      Overlapping malignant neoplasm of pancreas ([facility] [address] (3/11/2019)      Bacteremia due to Gram-negative bacteria (9/14/2019)         PHILL with hypotension and bacteremia. Oliguric- suspect ATN  Underlying pancreatic cancer  Volume/ electrolytes ok for now. Some CXR chest congestion but not symptomatic. Planning palliative measures.

## 2019-09-16 NOTE — PROGRESS NOTES
Palliative Care Progress Note    Patient: Karuna Hill MRN: [id number]  SSN: [id number]    YOB: 1945  Age: 66 y.o. Sex: female       Assessment/Plan:     Chief Complaint/Interval History: pt off vasopressors. Mostly unresponsive. Nursing notes occasional moaning    Principal Diagnosis:    · Altered Mental Status R41.82    Additional Diagnoses:   · Debility, Unspecified  R53.81  · Frailty  R54  · Encounter for Palliative Care  Z51.5    Palliative Performance Scale (PPS)  10%    Medical Decision Making:   Reviewed and summarized labs and imaging. I met with pt  at bedside.  states pt comfort is highest priority now. Plans of last rights tomorrow. I discussed focusing on comfort measures including stopping current abx.  agrees to this course of action. Stopped labs  Comfort measures only  Stop ivf  Stopped abx  Continue morphine prn pain or dyspnea  Continue ativan prn anxiety  Consulted hospice for possible bereavement services    Will discuss findings with members of the interdisciplinary team.      More than 50% of this 25 minute visit was spent counseling and coordination of care as outlined above. Subjective:     Review of Systems unable to obtain due to mentation     Objective:     Visit Vitals  /71   Pulse 95   Temp 97.8 °F (36.6 °C)   Resp 18   Ht 5' 1.5\" (1.562 m)   Wt 142 lb 3.2 oz (64.5 kg)   SpO2 98%   BMI 26.43 kg/m²       Physical Exam:    General:  Unresponsive, calm. frail   Eyes:  Conjunctivae/corneas clear    Nose: Nares normal. Septum midline. Neck: Supple, symmetrical, trachea midline, no JVD   Lungs:   Clear to auscultation bilaterally, unlabored   Heart:  Regular rate and rhythm, no murmur    Abdomen:   Soft, non-tender, non-distended   Extremities: Normal, atraumatic, no cyanosis or edema   Skin: Skin color, texture, turgor normal. No rash or lesions.    Neurologic: Not following commands   Psych: obtunded     Signed By: Cynthia Dumont Vanessa Rausch MD     September 16, 2019

## 2019-09-16 NOTE — HOSPICE
Met with daughter in room and discussed hospice philosophy, levels of care, management of symptoms and medications. Patient non-responsive with 10-15 second periods of apnea. Spouse not present but daughter states family [facility] a bed is available. Will plan on meeting again with family tomorrow once a bed is available to assess patient stability and coordinate transfer if applicable.  Thank you for this referral.  Radha Mauricio, RN, BSN  Community Nurse Liaison  [facility]  [phone]

## 2019-09-16 NOTE — PROGRESS NOTES
Hospitalist Progress Note    2019  Admit Date: 2019  6:58 PM   NAME: Veronica Almonte   :  1945   DOS:              19  MRN:  [id number]   Attending: Sherwin Turner MD  PCP:  Antonio Butler MD  Treatment Team: Attending Provider: Crescencio Timmons MD; Utilization Review: Carl Saab RN; Care Manager: Kehinde Hawley RN; Consulting Provider: Katarzyna Hare NP; Consulting Provider: Nikunj Yan MD; Primary Nurse: Carine Dinh    DNR     SUBJECTIVE:   As previously documented:  77yoF with pancreatic cancer on home hospice who presents to the ER for confusion and weakness. She has been having diarrhea for about 1 week. In the ED patient was hypotensive that required pressors support. Patient started on IV vanco/cefepime for sepsis ? PNA vs gastroenteritis. Blood cultures 1/2 with GNRs. Patient having PHILL, nephrology following. Family requested comfort measures only. 19    Veronica Almonte is pleasantly confused lying in the bed.  at bedside requested comfort care and if possible hospice house. 10+ ROS reviewed and negative except for positive in HPI.    Allergies   Allergen Reactions    Penicillins Unknown (comments)    Sulfa (Sulfonamide Antibiotics) Hives     Current Facility-Administered Medications   Medication Dose Route Frequency    LORazepam (ATIVAN) injection 0.5 mg  0.5 mg IntraVENous Q2H PRN    sodium chloride (NS) flush 5-40 mL  5-40 mL IntraVENous PRN    acetaminophen (TYLENOL) tablet 650 mg  650 mg Oral Q4H PRN    ondansetron (ZOFRAN) injection 4 mg  4 mg IntraVENous Q4H PRN    influenza vaccine - (6 mos+)(PF) (FLUARIX/FLULAVAL/FLUZONE QUAD) injection 0.5 mL  0.5 mL IntraMUSCular PRIOR TO DISCHARGE    lidocaine 4 % patch 1 Patch  1 Patch TransDERmal Q24H    morphine injection 2 mg  2 mg IntraVENous Q1H PRN    albuterol (PROVENTIL VENTOLIN) nebulizer solution 2.5 mg  2.5 mg Nebulization Q4H PRN There is no immunization history for the selected administration types on file for this patient.   Objective:     Patient Vitals for the past 24 hrs:   Temp Pulse Resp BP SpO2   09/16/19 1248  86 23  97 %   09/16/19 1212  86 10  98 %   09/16/19 1158 98 °F (36.7 °C) 86 11 111/62 98 %   09/16/19 1135  87 14  98 %   09/16/19 1122  86 21  98 %   09/16/19 1058  90 11 132/65 97 %   09/16/19 1053  95 18  98 %   09/16/19 1029  90 20 127/71 97 %   09/16/19 1000  89 16  97 %   09/16/19 0958  89 12 118/65 97 %   09/16/19 0957  87 15  97 %   09/16/19 0929  87 24 135/72 99 %   09/16/19 0925     99 %   09/16/19 0858  84 23 134/72 100 %   09/16/19 0829  82 17 141/73 100 %   09/16/19 0758  80 15 130/68 100 %   09/16/19 0729 97.8 °F (36.6 °C) 80 26 119/66 100 %   09/16/19 0658  79 24 122/62 100 %   09/16/19 0629  80 26 109/63 100 %   09/16/19 0558  78 (!) 39 105/56 100 %   09/16/19 0530  85 28 158/84 100 %   09/16/19 0429  78 25 106/55 99 %   09/16/19 0258 97.9 °F (36.6 °C) 80 17 102/55 100 %   09/16/19 0108  81 14 95/51 100 %   09/15/19 2358  79 14 91/55 100 %   09/15/19 2258 98.1 °F (36.7 °C) 82 19 105/59 99 %   09/15/19 2158  80 11 107/55 98 %   09/15/19 2058  83 22 96/56 98 %   09/15/19 2029  82 16 103/56 98 %   09/15/19 1959  82 21 102/56 97 %   09/15/19 1859 98.1 °F (36.7 °C) 82 24 100/55 99 %   09/15/19 1759  80 12 112/64 99 %   09/15/19 1743  79 8 104/58 99 %   09/15/19 1729  79 8 107/60 99 %   09/15/19 1714  80 10 105/55 99 %   09/15/19 1659  80 9 101/62 99 %   09/15/19 1643  79 (!) 6 102/59 99 %   09/15/19 1629  79 12 101/58 99 %   09/15/19 1614  79 9 102/55 99 %   09/15/19 1600 97.9 °F (36.6 °C)       09/15/19 1559  78 10 106/57 99 %   09/15/19 1543  76 17 95/51 99 %   09/15/19 1529  79 17 101/61 99 %   09/15/19 1514  79 (!) 7 97/55 99 %   09/15/19 1459  77 8 94/52 99 %   09/15/19 1443  76 (!) 7 94/52 99 %   09/15/19 1429  78 8 103/58 99 %   09/15/19 1414  79 8 100/55 99 %   09/15/19 1359  77 (!) 7 (!) 89/51 99 %   09/15/19 1343  77 (!) 7 92/51 99 %   09/15/19 1329  78 (!) 7 92/51 99 %   09/15/19 1314  78 9 91/50 99 %     Temp (24hrs), Av °F (36.7 °C), Min:97.8 °F (36.6 °C), Max:98.1 °F (36.7 °C)    Oxygen Therapy  O2 Sat (%): 97 % (19 124)  Pulse via Oximetry: 88 beats per minute (19)  O2 Device: Nasal cannula (19)  O2 Flow Rate (L/min): 2 l/min (19)  Oxygen Therapy  O2 Sat (%): 97 % (19 124)  Pulse via Oximetry: 88 beats per minute (198)  O2 Device: Nasal cannula (19)  O2 Flow Rate (L/min): 2 l/min (1946)    Physical Exam:  General:         Confused  HEENT:               Nares normal. No drainage  Lungs: No wheezing/rhonchi/rales  Cardiovascular:   RRR. No m/r/g. No pedal edema b/l. Abdomen:       Mid abdomen tenderness no guarding or rebound. (+) BS  Skin:         No rashes or lesions. Not Jaundiced  Neurologic:     No gross focal deficit. Psychiatric:         Good mood. Normal affect. DIAGNOSTIC STUDIES      Data Review:   Recent Results (from the past 24 hour(s))   VANCOMYCIN, RANDOM    Collection Time: 19  3:27 AM   Result Value Ref Range    Vancomycin, random 22.5 UG/ML   CBC WITH AUTOMATED DIFF    Collection Time: 19  3:27 AM   Result Value Ref Range    WBC 36.0 (H) 4.3 - 11.1 K/uL    RBC 3.18 (L) 4.05 - 5.2 M/uL    HGB 8.9 (L) 11.7 - 15.4 g/dL    HCT 27.7 (L) 35.8 - 46.3 %    MCV 87.1 79.6 - 97.8 FL    MCH 28.0 26.1 - 32.9 PG    MCHC 32.1 31.4 - 35.0 g/dL    RDW 14.2 11.9 - 14.6 %    PLATELET 044 944 - 120 K/uL    MPV 10.4 9.4 - 12.3 FL    ABSOLUTE NRBC 0.00 0.0 - 0.2 K/uL    DF AUTOMATED      NEUTROPHILS 90 (H) 43 - 78 %    LYMPHOCYTES 2 (L) 13 - 44 %    MONOCYTES 4 4.0 - 12.0 %    EOSINOPHILS 0 (L) 0.5 - 7.8 %    BASOPHILS 0 0.0 - 2.0 %    IMMATURE GRANULOCYTES 4 0.0 - 5.0 %    ABS. NEUTROPHILS 32.3 (H) 1.7 - 8.2 K/UL    ABS. LYMPHOCYTES 0.7 0.5 - 4.6 K/UL    ABS. MONOCYTES 1.4 (H) 0.1 - 1.3 K/UL    ABS. EOSINOPHILS 0.1 0.0 - 0.8 K/UL    ABS. BASOPHILS 0.2 0.0 - 0.2 K/UL    ABS. IMM. GRANS. 1.4 (H) 0.0 - 0.5 K/UL   METABOLIC PANEL, BASIC    Collection Time: 09/16/19  3:27 AM   Result Value Ref Range    Sodium 139 136 - 145 mmol/L    Potassium 3.9 3.5 - 5.1 mmol/L    Chloride 103 98 - 107 mmol/L    CO2 23 21 - 32 mmol/L    Anion gap 13 7 - 16 mmol/L    Glucose 90 65 - 100 mg/dL    BUN 61 (H) 8 - 23 MG/DL    Creatinine 4.75 (H) 0.6 - 1.0 MG/DL    GFR est AA 11 (L) >60 ml/min/1.73m2    GFR est non-AA 9 (L) >60 ml/min/1.73m2    Calcium 6.9 (L) 8.3 - 10.4 MG/DL       All Micro Results     Procedure Component Value Units Date/Time    CULTURE, BLOOD [[id number]] Collected:  09/14/19 1048    Order Status:  Completed Specimen:  Blood Updated:  09/16/19 1051     Special Requests: CENTRAL        Culture result: NO GROWTH 2 DAYS       CULTURE, BLOOD [[id number]] Collected:  09/12/19 1922    Order Status:  Completed Specimen:  Blood Updated:  09/16/19 1051     Special Requests: --        RIGHT  FOREARM       Culture result: NO GROWTH 4 DAYS       BLOOD CULTURE ID PANEL [[id number]]  (Abnormal) Collected:  09/12/19 1907    Order Status:  Completed Specimen:  Blood Updated:  09/16/19 0718     Acc. no. from Micro Order [id number]     Klebsiella pneumoniae DETECTED        Comment: RESULTS VERIFIED, PHONED TO AND READ BACK BY  Grabiel Okeefe @ 6037 ON 9/14/19 AK. KPC (Carbapenem Resistance Gene) NOT DETECTED        Comment: WARNING:  A Not Detected result for the KPC gene does not indicate susceptibility to carbapenems. Gram negative bacteria can be resistant to carbapenems by mechanisms other than carrying the KPC gene. INTERPRETATION       Gram negative marisa. Identified as Klebsiella pneumoniae.           CULTURE, BLOOD [[id number]]  (Abnormal)  (Susceptibility) Collected:  09/12/19 1602    Order Status:  Completed Specimen:  Blood Updated: 09/16/19 0718     Special Requests: --        LEFT  HAND       GRAM STAIN GRAM NEGATIVE RODS         AEROBIC BOTTLE POSITIVE               RESULTS VERIFIED, PHONED TO AND READ BACK BY Laila Lorenzoia @7658 ON 9/14 AK. Culture result: KLEBSIELLA PNEUMONIAE         REFER TO BIOFIRE PANEL ACC [id number]    CULTURE, BLOOD [[id number]]     Order Status:  Canceled Specimen:  Blood     C. DIFFICILE AG & TOXIN A/B [[id number]] Collected:  09/13/19 0545    Order Status:  Completed Specimen:  Stool Updated:  09/13/19 5172     [address] ANTIGEN       C. DIFFICILE GDH ANTIGEN-NEGATIVE           C. difficile toxin       C. DIFFICILE TOXIN-NEGATIVE           PCR Reflex NOT APPLICABLE        INTERPRETATION       NEGATIVE FOR TOXIGENIC C. DIFFICILE           Clinical Consideration       NEGATIVE FOR TOXIGENIC C. DIFFICILE                Imaging /Procedures /Studies:    CXR Results  (Last 48 hours)    None        CT Results  (Last 48 hours)    None        No results found. No results found for this visit on 09/12/19.     Labs and Studies from previous 24 hours have been personally reviewed by myself Nikolas Sorenson 96 Problems    Diagnosis Date Noted    Bacteremia due to Gram-negative bacteria 09/14/2019    Pulmonary infiltrates 09/12/2019    Septic shock (HCC) 09/12/2019    Overlapping malignant neoplasm of pancreas ([facility] 03/11/2019     Hospital Problems as of 9/16/2019 Date Reviewed: 9/16/2019          Codes Class Noted - Resolved POA    Bacteremia due to Gram-negative bacteria ICD-10-CM: R78.81  ICD-9-CM: 790.7, 041.85  9/14/2019 - Present Yes        Pulmonary infiltrates ICD-10-CM: R91.8  ICD-9-CM: 793.19  9/12/2019 - Present Yes        * (Principal) Septic shock ([facility] ICD-10-CM: A41.9, R65.21  ICD-9-CM: 038.9, 785.52, 995.92  9/12/2019 - Present Yes        Overlapping malignant neoplasm of pancreas (HCC) (Chronic) ICD-10-CM: C25.8  ICD-9-CM: 157.8  3/11/2019 - Present Yes        HTN (hypertension) ICD-10-CM: I10  ICD-9-CM: 401.9  1/7/2015 - Present               A/P:    -Septic shock  Palliative care eval appreciated  Patient is now comfort measures only  Blood pressure and O2 has been stable  I informed nurse to call hospice for evaluation hospice house. If not bed available at hospice house transfer patient to floor.   was informed     CODE Status: DNR          Ijeoma Hager MD  09/16/19

## 2019-09-16 NOTE — PROGRESS NOTES
TRANSFER - IN REPORT:    Verbal report received from Terri(name) on Cordell  being received from CCU(unit) for change in patient condition(comfort care)      Report consisted of patients Situation, Background, Assessment and   Recommendations(SBAR). Information from the following report(s) SBAR was reviewed with the receiving nurse. Opportunity for questions and clarification was provided. Assessment completed upon patients arrival to unit and care assumed.

## 2019-09-16 NOTE — INTERDISCIPLINARY ROUNDS
Interdisciplinary team rounds were held 9/16/2019 with the following team members:Care Management, Nursing, Nurse Practitioner, Nutrition, Palliative Care, Pastoral Care, Pharmacy, Physician, Respiratory Therapy and Clinical Coordinator and the patient. Plan of care discussed. See clinical pathway and/or care plan for interventions and desired outcomes.

## 2019-09-16 NOTE — PROGRESS NOTES
Patient transferred from CCU to 5th floor on this date. Hospice liaison, Radha Mauricio, has met with family to discuss potential hospice house placement. At this time, patient does not appear to be stable for transport and there are no available beds at [facility], per [facility] liaison. Patient is planned for another hospice family meeting tomorrow at 1200PM. RN and CM will remain available to assist with disposition planning or service coordination as needed during this admission.

## 2019-09-16 NOTE — PROGRESS NOTES
TRANSFER - OUT REPORT:    Verbal report given to PA Bartlett (name) on [facility]  being transferred to [address] [place] [address] (unit) for routine progression of care   (comfort measures)    Report consisted of patients Situation, Background, Assessment and   Recommendations(SBAR). Information from the following report(s) SBAR, Kardex, Procedure Summary, Intake/Output, MAR, Recent Results, Cardiac Rhythm NSR and Alarm Parameters  was reviewed with the receiving nurse. Lines:   Quad Lumen 09/13/19 Right Internal jugular (Active)   Central Line Being Utilized Yes 9/16/2019 10:18 AM   Criteria for Appropriate Use Hemodynamically unstable, requiring monitoring lines, vasopressors, or volume resuscitation 9/16/2019 10:18 AM   Site Assessment Clean, dry, & intact 9/16/2019 10:18 AM   Infiltration Assessment 0 9/16/2019 10:18 AM   Affected Extremity/Extremities Color distal to insertion site pink (or appropriate for race) 9/16/2019 10:18 AM   Date of Last Dressing Change 09/14/19 9/16/2019 10:18 AM   Dressing Status Clean, dry, & intact 9/16/2019 10:18 AM   Dressing Type Disk with Chlorhexadine gluconate (CHG); Transparent 9/16/2019 10:18 AM   Action Taken Other (comment) 9/16/2019 10:18 AM   Proximal Hub Color/Line Status White;Patent; Flushed 9/16/2019  7:29 AM   Positive Blood Return (Medial Site) Yes 9/16/2019  7:29 AM   Medial 1 Hub Color/Line Status Gray;Patent; Flushed 9/16/2019  7:29 AM   Positive Blood Return (Lateral Site) Yes 9/16/2019  7:29 AM   Medial 2 Hub Color/Line Status Blue;Patent; Flushed 9/16/2019  7:29 AM   Positive Blood Return (Site #3) Yes 9/16/2019  7:29 AM   Distal Hub Color/Line Status Brown;Patent; Flushed 9/16/2019  7:29 AM   Positive Blood Return (Site #4) Yes 9/16/2019  7:29 AM   External Catheter Length (cm) 0 centimeters 9/13/2019  7:00 PM   Alcohol Cap Used No 9/16/2019 10:18 AM       Peripheral IV 09/13/19 Right Hand (Active)   Site Assessment Clean, dry, & intact 9/16/2019  7:29 AM   Phlebitis Assessment 0 9/16/2019  7:29 AM   Infiltration Assessment 0 9/16/2019  7:29 AM   Dressing Status Clean, dry, & intact 9/16/2019  7:29 AM   Dressing Type Transparent;Tape 9/16/2019  7:29 AM   Hub Color/Line Status Pink;Patent; Flushed 9/16/2019  7:29 AM   Alcohol Cap Used No 9/16/2019  7:29 AM        Opportunity for questions and clarification was provided.       Patient transported with:   O2 @ 2 liters

## 2019-09-16 NOTE — PROGRESS NOTES
Problem: Falls - Risk of  Goal: *Absence of Falls  Description  Document Lamberto  Fall Risk and appropriate interventions in the flowsheet. Outcome: Progressing Towards Goal  Note:   Fall Risk Interventions:  Mobility Interventions: Assess mobility with egress test, Bed/chair exit alarm, Communicate number of staff needed for ambulation/transfer, Patient to call before getting OOB, Strengthening exercises (ROM-active/passive)    Mentation Interventions: Adequate sleep, hydration, pain control, Bed/chair exit alarm, Door open when patient unattended, Evaluate medications/consider consulting pharmacy, Family/sitter at bedside, Increase mobility, More frequent rounding, Reorient patient, Room close to nurse's station, Toileting rounds, Update white board    Medication Interventions: Bed/chair exit alarm, Evaluate medications/consider consulting pharmacy    Elimination Interventions: Bed/chair exit alarm, Call light in reach, Toileting schedule/hourly rounds    History of Falls Interventions: Bed/chair exit alarm, Consult care management for discharge planning, Evaluate medications/consider consulting pharmacy, Room close to nurse's station, Assess for delayed presentation/identification of injury for 48 hrs (comment for end date), Vital signs minimum Q4HRs X 24 hrs (comment for end date)         Problem: Pressure Injury - Risk of  Goal: *Prevention of pressure injury  Description  Document Salvatore Scale and appropriate interventions in the flowsheet.   Outcome: Progressing Towards Goal  Note:   Pressure Injury Interventions:  Sensory Interventions: Assess changes in LOC, Assess need for specialty bed, Avoid rigorous massage over bony prominences, Check visual cues for pain, Discuss PT/OT consult with provider, Float heels, Keep linens dry and wrinkle-free, Maintain/enhance activity level, Minimize linen layers, Monitor skin under medical devices, Pad between skin to skin, Pressure redistribution bed/mattress (bed type), Turn and reposition approx. every two hours (pillows and wedges if needed)    Moisture Interventions: Absorbent underpads, Apply protective barrier, creams and emollients, Assess need for specialty bed, Check for incontinence Q2 hours and as needed, Internal/External urinary devices, Limit adult briefs, Maintain skin hydration (lotion/cream), Minimize layers, Moisture barrier, Offer toileting Q_hr    Activity Interventions: Assess need for specialty bed, Pressure redistribution bed/mattress(bed type)    Mobility Interventions: Assess need for specialty bed, Float heels, HOB 30 degrees or less, Pressure redistribution bed/mattress (bed type), Turn and reposition approx.  every two hours(pillow and wedges)    Nutrition Interventions: Document food/fluid/supplement intake, Discuss nutritional consult with provider, Offer support with meals,snacks and hydration    Friction and Shear Interventions: Apply protective barrier, creams and emollients, Foam dressings/transparent film/skin sealants, HOB 30 degrees or less, Lift sheet, Lift team/patient mobility team, Minimize layers, Transfer aides:transfer board/Starla lift/ceiling lift, Transferring/repositioning devices

## 2019-09-17 NOTE — PROGRESS NOTES
Hospitalist Progress Note    2019  Admit Date: 2019  6:58 PM   NAME: Jesse Mae   :  1945   DOS:              19  MRN:  [id number]   Attending: Cricket Paulino MD  PCP:  Jackie Keyes MD  Treatment Team: Attending Provider: Raj Vergara MD; Utilization Review: Bonnie Cogan, RN; Consulting Provider: Nino Szymanski NP; Care Manager: Terri Shelton    DNR     SUBJECTIVE:   As previously documented:  77yoF with pancreatic cancer on home hospice who presents to the ER for confusion and weakness. She has been having diarrhea for about 1 week. In the ED patient was hypotensive that required pressors support. Patient started on IV vanco/cefepime for sepsis ? PNA vs gastroenteritis. Blood cultures 1/2 with GNRs. Patient having PHILL, nephrology following. Family requested comfort measures only. Awaiting hospice house bed.       19    Jesse Mae is obtunded lying in bed. Family at bedside. No acute events. Pending transfer to hospice house once bed is available. .         10+ ROS reviewed and negative except for positive in HPI.    Allergies   Allergen Reactions    Penicillins Unknown (comments)    Sulfa (Sulfonamide Antibiotics) Hives     Current Facility-Administered Medications   Medication Dose Route Frequency    LORazepam (ATIVAN) injection 0.5 mg  0.5 mg IntraVENous Q2H PRN    sodium chloride (NS) flush 5-40 mL  5-40 mL IntraVENous PRN    acetaminophen (TYLENOL) tablet 650 mg  650 mg Oral Q4H PRN    ondansetron (ZOFRAN) injection 4 mg  4 mg IntraVENous Q4H PRN    influenza vaccine - (6 mos+)(PF) (FLUARIX/FLULAVAL/FLUZONE QUAD) injection 0.5 mL  0.5 mL IntraMUSCular PRIOR TO DISCHARGE    lidocaine 4 % patch 1 Patch  1 Patch TransDERmal Q24H    morphine injection 2 mg  2 mg IntraVENous Q1H PRN    albuterol (PROVENTIL VENTOLIN) nebulizer solution 2.5 mg  2.5 mg Nebulization Q4H PRN         There is no immunization history for the selected administration types on file for this patient. Objective:     Patient Vitals for the past 24 hrs:   Temp Pulse Resp BP SpO2   19 97.7 °F (36.5 °C) 89 12 127/63 96 %   193 97.6 °F (36.4 °C) 89  110/62 98 %   19 98 °F (36.7 °C) 88  112/62 97 %     Temp (24hrs), Av.8 °F (36.6 °C), Min:97.6 °F (36.4 °C), Max:98 °F (36.7 °C)    Oxygen Therapy  O2 Sat (%): 96 % (19 0753)  Pulse via Oximetry: 85 beats per minute (19 1458)  O2 Device: Nasal cannula (19)  O2 Flow Rate (L/min): 2 l/min (19 0230)  Oxygen Therapy  O2 Sat (%): 96 % (193)  Pulse via Oximetry: 85 beats per minute (19 1458)  O2 Device: Nasal cannula (190)  O2 Flow Rate (L/min): 2 l/min (19 0230)    Physical Exam:  General:         Obtunded   HEENT:               Nares normal. No drainage  Lungs: No wheezing/rhonchi/rales  Cardiovascular:   RRR. No m/r/g. No pedal edema b/l. Abdomen:       Mid abdomen tenderness no guarding or rebound. (+) BS  Skin:         No rashes or lesions. Not Jaundiced. No mottling  Extremities:         Warm   Neurologic:     No gross focal deficit. Psychiatric:         Good mood. Normal affect. DIAGNOSTIC STUDIES      Data Review:   No results found for this or any previous visit (from the past 24 hour(s)).     All Micro Results     Procedure Component Value Units Date/Time    CULTURE, BLOOD [[id number]] Collected:  19 1048    Order Status:  Completed Specimen:  Blood Updated:  19     Special Requests: CENTRAL        Culture result: NO GROWTH 3 DAYS       CULTURE, BLOOD [[id number]] Collected:  19    Order Status:  Completed Specimen:  Blood Updated:  19     Special Requests: --        RIGHT  FOREARM       Culture result: NO GROWTH 5 DAYS       BLOOD CULTURE ID PANEL [[id number]]  (Abnormal) Collected:  19    Order Status:  Completed Specimen:  Blood Updated: 09/16/19 0718     Acc. no. from Micro Order [id number]     Klebsiella pneumoniae DETECTED        Comment: RESULTS VERIFIED, PHONED TO AND READ BACK BY  [facility]on [employer] @ 2978 ON 9/14/19 AK. KPC (Carbapenem Resistance Gene) NOT DETECTED        Comment: WARNING:  A Not Detected result for the KPC gene does not indicate susceptibility to carbapenems. Gram negative bacteria can be resistant to carbapenems by mechanisms other than carrying the KPC gene. INTERPRETATION       Gram negative marisa. Identified as Klebsiella pneumoniae. CULTURE, BLOOD [[id number]]  (Abnormal)  (Susceptibility) Collected:  09/12/19 1907    Order Status:  Completed Specimen:  Blood Updated:  09/16/19 0718     Special Requests: --        LEFT  HAND       GRAM STAIN GRAM NEGATIVE RODS         AEROBIC BOTTLE POSITIVE               RESULTS VERIFIED, PHONED TO AND READ BACK BY [employer] @6862 ON 9/14 AK. Culture result: KLEBSIELLA PNEUMONIAE         REFER TO TSCA PANEL ACC [id number]    CULTURE, BLOOD [[id number]]     Order Status:  Canceled Specimen:  Blood     C. DIFFICILE AG & TOXIN A/B [[id number]] Collected:  09/13/19 0545    Order Status:  Completed Specimen:  Stool Updated:  09/13/19 1153     [address] ANTIGEN       C. DIFFICILE [facility] ANTIGEN-NEGATIVE           C. difficile toxin       C. DIFFICILE TOXIN-NEGATIVE           PCR Reflex NOT APPLICABLE        INTERPRETATION       NEGATIVE FOR TOXIGENIC C. DIFFICILE           Clinical Consideration       NEGATIVE FOR TOXIGENIC C. DIFFICILE                Imaging /Procedures /Studies:    CXR Results  (Last 48 hours)    None        CT Results  (Last 48 hours)    None        No results found. No results found for this visit on 09/12/19.     Labs and Studies from previous 24 hours have been personally reviewed by myself    Nikolas Sorenson 96 Problems    Diagnosis Date Noted    Bacteremia due to Gram-negative bacteria 09/14/2019    Pulmonary infiltrates 09/12/2019    Septic shock ([facility] 09/12/2019    Overlapping malignant neoplasm of pancreas ([facility] 03/11/2019     Hospital Problems as of 9/17/2019 Date Reviewed: 9/16/2019          Codes Class Noted - Resolved POA    Bacteremia due to Gram-negative bacteria ICD-10-CM: R78.81  ICD-9-CM: 790.7, 041.85  9/14/2019 - Present Yes        Pulmonary infiltrates ICD-10-CM: R91.8  ICD-9-CM: 793.19  9/12/2019 - Present Yes        * (Principal) Septic shock ([facility] ICD-10-CM: A41.9, R65.21  ICD-9-CM: 038.9, 785.52, 995.92  9/12/2019 - Present Yes        Overlapping malignant neoplasm of pancreas (HCC) (Chronic) ICD-10-CM: C25.8  ICD-9-CM: 157.8  3/11/2019 - Present Yes        HTN (hypertension) ICD-10-CM: I10  ICD-9-CM: 401.9  1/7/2015 - Present               A/P:    -Septic shock  Palliative care eval appreciated  Patient is now comfort measures only  Blood pressure and O2 has been stable  Pending hospice house transfer  If not bed available at hospice house transfer patient to floor.  was informed     CODE Status: DNR/comfort care    Please call [phone] with questions or concerns. Thank you.       Renae Sifuentes MD  09/17/19

## 2019-09-17 NOTE — PROGRESS NOTES
LOUANN spoke with [facility] liaison, [address], who informed patient is approved for admission to [address] [place] [address], and appears to be stable for transport at this time. Unfortunately, there are currently no available beds at the hospice house. Anticipate DC to [address] [place] [address] as soon as a bed becomes available. LOUANN will remain available to assist with any other needs.

## 2019-09-17 NOTE — PROGRESS NOTES
END OF SHIFT NOTE:    Intake/Output  No intake/output data recorded. Voiding: YES  Catheter: YES  Drain:              Stool:  0 occurrences. Stool Assessment  Stool Color: Green (09/16/19 1054)  Stool Appearance: Watery (09/16/19 1054)  Stool Amount: Large (09/16/19 1054)  Stool Source/Status: Rectum; Incontinence (09/16/19 1054)    Emesis:  0 occurrences. VITAL SIGNS  Patient Vitals for the past 12 hrs:   Temp Pulse Resp BP SpO2   09/17/19 0753 97.7 °F (36.5 °C) 89 12 127/63 96 %       Pain Assessment  Pain 1  Pain Scale 1: Visual (09/17/19 1922)  Pain Intensity 1: 0 (09/17/19 1922)  Patient Stated Pain Goal: Unable to verbalize/indicate pain (09/17/19 0745)  Pain Reassessment 1: Yes (09/15/19 0959)  Pain Onset 1: chronic (09/13/19 2213)  Pain Location 1: Abdomen(Pt points to abdomen) (09/13/19 2213)  Pain Orientation 1: Left (09/13/19 2213)  Pain Description 1: (Pt does not verbalize.) (09/13/19 2213)  Pain Intervention(s) 1: Medication (see MAR) (09/15/19 0925)    Ambulating  No    Additional Information: waiting for bed at hospice house      Shift report given to oncoming nurse at the bedside.     Jennifer Lane, RN

## 2019-09-17 NOTE — PROGRESS NOTES
Palliative Care Progress Note    Patient: Greer Zamora MRN: [id number]  SSN: [id number]    YOB: 1945  Age: 66 y.o. Sex: female       Assessment/Plan:     Chief Complaint/Interval History: unresponsive but comfortable. VS stable       Principal Diagnosis:    · Altered Mental Status R41.82    Additional Diagnoses:   · Debility, Unspecified  R53.81  · Failure to Thrive  R62.7  · Frailty  R54  · Encounter for Palliative Care  Z51.5    Palliative Performance Scale (PPS)  PPS: 40    Medical Decision Making:   Reviewed and summarized notes over last 24 hours   Discussed case with appropriate providersRadha Weir RN    Pt unresponsive, appears comfortable. Family at bedside. They report pt has been comfortable all night. She has required intermittent dosing of Ativan and Morphine IV. Pt's VS have remained stable, and a meeting is planned with [facility] today at 12 to re-evaluate for admission to [facility]. Family voiced appreciation for care pt has received. Provided support. Will continue to follow. Will discuss findings with members of the interdisciplinary team.         More than 50% of this 15 minute visit was spent counseling and coordination of care as outlined above. Subjective:     Review of Systems:  Review of systems not obtained due to patient factors- unresponsive      Objective:     Visit Vitals  /63 (BP 1 Location: Left arm, BP Patient Position: At rest)   Pulse 89   Temp 97.7 °F (36.5 °C)   Resp 12   Ht 5' 1.5\" (1.562 m)   Wt 142 lb 3.2 oz (64.5 kg)   SpO2 96%   BMI 26.43 kg/m²       Physical Exam:    General:  Unresponsive. No acute distress. Eyes:  Conjunctivae/corneas clear    Nose: Nares normal. Septum midline.    Neck: Supple, symmetrical, trachea midline   Lungs:   unlabored   Heart:  Deferred    Abdomen:   Soft, non-tender, non-distended   Extremities: Normal, atraumatic, no cyanosis or edema   Skin: Skin color, texture, turgor normal.    Neurologic: Unresponsive    Psych: Unresponsive      Signed By: Cyndi Cooney NP     September 17, 2019

## 2019-09-17 NOTE — PROGRESS NOTES
END OF SHIFT NOTE:    Intake/Output  09/16 1901 - 09/17 0700  In: -   Out: 100 [Urine:100]   Voiding: YES  Catheter: YES  Drain:              Stool:  2 occurrences. Stool Assessment  Stool Color: Green (09/16/19 1054)  Stool Appearance: Watery (09/16/19 1054)  Stool Amount: Large (09/16/19 1054)  Stool Source/Status: Rectum; Incontinence (09/16/19 1054)    Emesis:  0 occurrences. VITAL SIGNS  Patient Vitals for the past 12 hrs:   Temp Pulse BP SpO2   09/16/19 2233 97.6 °F (36.4 °C) 89 110/62 98 %   09/16/19 1955 98 °F (36.7 °C) 88 112/62 97 %       Pain Assessment  Pain 1  Pain Scale 1: Adult Nonverbal Pain Scale (09/16/19 1931)  Pain Intensity 1: 0 (09/16/19 1931)  Patient Stated Pain Goal: Unable to verbalize/indicate pain (09/16/19 1931)  Pain Reassessment 1: Yes (09/15/19 0959)  Pain Onset 1: chronic (09/13/19 2213)  Pain Location 1: Abdomen(Pt points to abdomen) (09/13/19 2213)  Pain Orientation 1: Left (09/13/19 2213)  Pain Description 1: (Pt does not verbalize.) (09/13/19 2213)  Pain Intervention(s) 1: Medication (see MAR) (09/15/19 0925)    Ambulating  No    Additional Information: Pt resting comfortably in bed with family at bedside. Morphine and Ativan given x1. Shift report given to oncoming nurse at the bedside.     Luz Elena Trejo, RN

## 2019-09-18 PROBLEM — E87.20 LACTIC ACIDOSIS: Status: ACTIVE | Noted: 2019-01-01

## 2019-09-18 PROBLEM — R32 URINARY INCONTINENCE: Status: ACTIVE | Noted: 2018-02-06

## 2019-09-18 PROBLEM — D50.8 IRON DEFICIENCY ANEMIA SECONDARY TO INADEQUATE DIETARY IRON INTAKE: Status: ACTIVE | Noted: 2019-01-01

## 2019-09-18 PROBLEM — I50.9 CHF (CONGESTIVE HEART FAILURE) (HCC): Status: ACTIVE | Noted: 2019-01-01

## 2019-09-18 PROBLEM — C25.9 ADENOCARCINOMA OF PANCREAS (HCC): Status: ACTIVE | Noted: 2019-01-01

## 2019-09-18 PROBLEM — I95.9 HYPOTENSION: Status: ACTIVE | Noted: 2019-01-01

## 2019-09-18 PROBLEM — I25.10 ASHD (ARTERIOSCLEROTIC HEART DISEASE): Status: ACTIVE | Noted: 2019-01-01

## 2019-09-18 PROBLEM — N17.9 ACUTE RENAL FAILURE (ARF) (HCC): Status: ACTIVE | Noted: 2019-01-01

## 2019-09-18 PROBLEM — C78.6 PERITONEAL CARCINOMATOSIS (HCC): Status: ACTIVE | Noted: 2019-01-01

## 2019-09-18 PROBLEM — K86.89 PANCREATIC MASS: Status: ACTIVE | Noted: 2019-01-01

## 2019-09-18 PROBLEM — E55.9 VITAMIN D DEFICIENCY: Status: ACTIVE | Noted: 2017-06-19

## 2019-09-18 PROBLEM — D72.823 LEUKEMOID REACTION: Status: ACTIVE | Noted: 2019-01-01

## 2019-09-18 PROBLEM — N39.41 URGE INCONTINENCE: Status: ACTIVE | Noted: 2018-08-02

## 2019-09-18 PROBLEM — A41.50 GRAM NEGATIVE SEPSIS (HCC): Status: ACTIVE | Noted: 2019-01-01

## 2019-09-18 PROBLEM — I73.9 PVD (PERIPHERAL VASCULAR DISEASE) (HCC): Status: ACTIVE | Noted: 2019-01-01

## 2019-09-18 NOTE — DISCHARGE SUMMARY
Discharge Summary Patient: Jenaro Piper MRN: [id number]  SSN: [id number] YOB: 1945  Age: 66 y.o. Sex: female Admit Date: 9/12/2019 Discharge Date: 9/18/2019 Admission Diagnoses: Pneumonia [J18.9] Sepsis ([facility] [A41.9] Discharge Diagnoses:  
Problem List as of 9/18/2019 Date Reviewed: 9/16/2019 Codes Class Noted - Resolved Adenocarcinoma of pancreas ([facility] ICD-10-CM: C25.9 ICD-9-CM: 157.9  9/18/2019 - Present Bacteremia due to Gram-negative bacteria ICD-10-CM: R78.81 ICD-9-CM: 790.7, 041.85  9/14/2019 - Present Pulmonary infiltrates ICD-10-CM: R91.8 ICD-9-CM: 793.19  9/12/2019 - Present * (Principal) Septic shock ([facility] ICD-10-CM: A41.9, R65.21 ICD-9-CM: 038.9, 785.52, 995.92  9/12/2019 - Present Overlapping malignant neoplasm of pancreas (HCC) (Chronic) ICD-10-CM: C25.8 ICD-9-CM: 157.8  3/11/2019 - Present Asymptomatic carotid artery stenosis ICD-10-CM: I65.29 ICD-9-CM: 433.10  2/3/2015 - Present  
   
 TIA (transient ischemic attack) ICD-10-CM: G45.9 ICD-9-CM: 435.9  1/7/2015 - Present Overview Signed 1/20/2015  8:32 AM by Lazaro Boyce NP  
  1/19/15 (Dr Tripp Meyer) Left internal carotid artery stenosis with  
recent transient ischemic attack. HTN (hypertension) ICD-10-CM: I10 
ICD-9-CM: 401.9  1/7/2015 - Present GERD (gastroesophageal reflux disease) ICD-10-CM: K21.9 ICD-9-CM: 530.81  1/7/2015 - Present Hyponatremia ICD-10-CM: E87.1 ICD-9-CM: 276.1  1/7/2015 - Present Discharge Condition: Critical 
 
Hospital Course: As previously documented:  76yoF with pancreatic cancer on home hospice who presents to the ER for confusion and weakness. Venus Keyes has been having diarrhea for about 1 week. In the ED patient was hypotensive that required pressors support. Patient started on IV vanco/cefepime for sepsis ? PNA vs gastroenteritis.  Blood cultures 1/2 with GNRs. Patient having PHILL, nephrology following. Family requested comfort measures only. Patient remains obtunded but comfortable. Discharged in critical condition to hospice house. 
  
  
Physical Exam: 
General: No acute distress, obtunded lying in bed HEENT:  NCAT, dry mucous membranes Skin:   No rash noted Cardio:  Tachycardic, normal S1/S2, no rubs, no gallops, no murmurs Pulm:   Heaving even respirations Extremity:  Atraumatic, no deformities, no edema, no mottling or extremity coolness Neuro:  Obtunded Psych:  Deferred Consults: None Disposition: hospice house Discharge Medications:  
Current Discharge Medication List  
  
STOP taking these medications  
  
 rosuvastatin (CRESTOR) 20 mg tablet Comments:  
Reason for Stopping:   
   
 tolterodine ER (DETROL LA) 4 mg ER capsule Comments:  
Reason for Stopping: LEVOTHYROXINE SODIUM (LEVOTHYROXINE PO) Comments:  
Reason for Stopping:   
   
 calcium carbonate (OS-ESTHELA) 500 mg calcium (1,250 mg) tablet Comments:  
Reason for Stopping:   
   
 cyanocobalamin 1,000 mcg tablet Comments:  
Reason for Stopping: CRANBERRY FRUIT EXTRACT (CRANBERRY CONCENTRATE PO) Comments:  
Reason for Stopping:   
   
 BACILLUS COAGULANS (DIGESTIVE ADVANTAGE PO) Comments:  
Reason for Stopping:   
   
 meloxicam (MOBIC) 7.5 mg tablet Comments:  
Reason for Stopping:   
   
 tiZANidine (ZANAFLEX) 4 mg tablet Comments:  
Reason for Stopping:   
   
 atorvastatin (LIPITOR) 80 mg tablet Comments:  
Reason for Stopping:   
   
 ergocalciferol (VITAMIN D2) 50,000 unit capsule Comments:  
Reason for Stopping:   
   
 valsartan (DIOVAN) 160 mg tablet Comments:  
Reason for Stopping:   
   
 esomeprazole (NEXIUM) 20 mg capsule Comments:  
Reason for Stopping:   
   
 aspirin 81 mg chewable tablet Comments:  
Reason for Stopping:   
   
 carvedilol (COREG) 6.25 mg tablet Comments:  
Reason for Stopping:   
   
 magnesium 250 mg tab Comments: Reason for Stopping:   
   
 Biotin 2,500 mcg cap Comments:  
Reason for Stopping: CALCIUM CARBONATE/VITAMIN D3 (CALCIUM 600 + D PO) Comments:  
Reason for Stopping:   
   
  
 
 
Activity: Bedrest 
Diet: Comfort feeding Wound Care: None needed Follow-up Appointments Procedures  FOLLOW UP VISIT Appointment in: Other (Specify) None None Standing Status:   Standing Number of Occurrences:   1 Order Specific Question:   Appointment in Answer: Other (Specify) Signed By: Nury Sanchez MD   
 September 18, 2019

## 2019-09-18 NOTE — PROGRESS NOTES
Pt is being discharged today to [facility]. ETD 1230 RN notified and will let family know in pts room. Care Management Interventions PCP Verified by CM: Yes Mode of Transport at Discharge: Self Transition of Care Consult (CM Consult): Discharge Planning Discharge Durable Medical Equipment: No 
Physical Therapy Consult: No 
Occupational Therapy Consult: No 
Speech Therapy Consult: No 
Current Support Network: [facility] Confirm Follow Up Transport: Family Plan discussed with Pt/Family/Caregiver: Yes Freedom of Choice Offered: Yes [employer] Information Provided?: No 
Discharge Location Discharge Placement: Home

## 2019-09-18 NOTE — PROGRESS NOTES
Problem: Falls - Risk of  Goal: *Absence of Falls  Description  Document Cathi Valladares Fall Risk and appropriate interventions in the flowsheet. Outcome: Progressing Towards Goal  Note:   Fall Risk Interventions:  Mobility Interventions: Bed/chair exit alarm    Mentation Interventions: Bed/chair exit alarm, Door open when patient unattended, Adequate sleep, hydration, pain control, Room close to nurse's station    Medication Interventions: Bed/chair exit alarm    Elimination Interventions: Call light in reach, Bed/chair exit alarm    History of Falls Interventions: Bed/chair exit alarm, Door open when patient unattended, Room close to nurse's station         Problem: Pressure Injury - Risk of  Goal: *Prevention of pressure injury  Description  Document Salvatore Scale and appropriate interventions in the flowsheet.   Outcome: Progressing Towards Goal  Note:   Pressure Injury Interventions:  Sensory Interventions: Assess changes in LOC, Check visual cues for pain, Float heels, Keep linens dry and wrinkle-free, Maintain/enhance activity level, Minimize linen layers, Pressure redistribution bed/mattress (bed type), Pad between skin to skin    Moisture Interventions: Absorbent underpads, Apply protective barrier, creams and emollients, Internal/External urinary devices, Limit adult briefs, Maintain skin hydration (lotion/cream), Minimize layers, Moisture barrier    Activity Interventions: Pressure redistribution bed/mattress(bed type)    Mobility Interventions: Float heels, HOB 30 degrees or less, Pressure redistribution bed/mattress (bed type)    Nutrition Interventions: (Pt not taking PO at this time)    Friction and Shear Interventions: HOB 30 degrees or less, Apply protective barrier, creams and emollients, Lift sheet, Minimize layers                Problem: Pain  Goal: Verbalize satisfaction of level of comfort and symptom control  Description  Pt will remain free of pain during stay at [facility] aeb FLACC score of 2 or less    Medication:  Dilaudid 0.5 mg IV/SQ q 30 minutes PRN pain    Outcome: Progressing Towards Goal     Problem: Infection - Risk of, Urinary Catheter-Associated Urinary Tract Infection  Goal: *Absence of infection signs and symptoms  Outcome: Progressing Towards Goal     Problem: Infection - Risk of, Central Venous Catheter-Associated Bloodstream Infection  Goal: *Absence of infection signs and symptoms  Outcome: Progressing Towards Goal     Problem: Patient Education: Go to Patient Education Activity  Goal: Patient/Family Education  Outcome: Resolved/Met     Problem: Patient Education: Go to Patient Education Activity  Goal: Patient/Family Education  Outcome: Resolved/Met     Problem: Hospice Orientation  Goal: Demonstrate understanding of hospice philosophy, plan of care, and home hospice program  Description  The patient/family/caregiver will demonstrate understanding of hospice philosophy, plan of care and the home hospice program as evidenced by participation in meeting the patient's psychosocial, spiritual, medical, and physical needs inclusive of medical supplies/equipment focusing on symptoms.   Outcome: Resolved/Met

## 2019-09-18 NOTE — PROGRESS NOTES
190:  Report from Jeffry Avitia [facility]. Patient ID by name and . Patient is obtunded. No s/sx of pain, agitation, or dyspnea. Bed low and locked and all safety measures in place. :  Scheduled flushes done. Assessment complete. :  PRN Hydromorphone IV given post repositioning. Patient does not tolerate repositioning well. 0120:  Patient resting with no s/sx of pain, agitation, or dyspnea. Son asleep at the bedside. 4072:  Medicated prior to repositioning with PRN Hydromorphone IV and Haldol IV. Patient resting in bed with no s/sx of pain, agitation, or dyspnea. Patient required PRN medication pre and post repositioning as movement is not tolerated well. Son asleep at the bedside and all safety measures in place. Report to Jeffry Avitia RN.

## 2019-09-18 NOTE — PROGRESS NOTES
Palliative Care Progress Note    Patient: Alexis Gutierrez MRN: [id number]  SSN: [id number]    YOB: 1945  Age: 66 y.o. Sex: female       Assessment/Plan:     Chief Complaint/Interval History: unresponsive but comfortable. VS stable       Principal Diagnosis:    · Altered Mental Status R41.82    Additional Diagnoses:   · Debility, Unspecified  R53.81  · Failure to Thrive  R62.7  · Frailty  R54  · Encounter for Palliative Care  Z51.5    Palliative Performance Scale (PPS)  PPS: 40    Medical Decision Making:   Reviewed and summarized notes over last 24 hours   Discussed case with appropriate providers- ZULLY Castle    Pt unresponsive, appears comfortable. Daughter at bedside. Pt has been approved for GIP care at [address] [place] [address], and is awaiting bed. She has remained stable and comfortable. Hopefully will transport today. Discussed with daughter, and provided support. Will continue to follow. Will discuss findings with members of the interdisciplinary team.         More than 50% of this 15 minute visit was spent counseling and coordination of care as outlined above. Subjective:     Review of Systems:  Review of systems not obtained due to patient factors- unresponsive      Objective:     Visit Vitals  /55 (BP 1 Location: Left arm)   Pulse (!) 108   Temp 98.9 °F (37.2 °C)   Resp 10   Ht 5' 1.5\" (1.562 m)   Wt 142 lb 3.2 oz (64.5 kg)   SpO2 96%   BMI 26.43 kg/m²       Physical Exam:    General:  Unresponsive. No acute distress. Eyes:  Conjunctivae/corneas clear    Nose: Nares normal. Septum midline.    Neck: Supple, symmetrical, trachea midline   Lungs:   unlabored   Heart:  Deferred    Abdomen:   Soft, non-tender, non-distended   Extremities: Normal, atraumatic, no cyanosis or edema   Skin: Skin color, texture, turgor normal.    Neurologic: Unresponsive    Psych: Unresponsive      Signed By: Anastasiia Guzman NP     September 18, 2019

## 2019-09-18 NOTE — PROGRESS NOTES
Pt admitted to [facility] for GIP level of care with hospice dx of adenocarcinoma of the pancreas and symptoms of pain and dyspnea. On admission pt is responsive to pain. Moans and groans on movement with a FLACC score of 2. Eyes are closed at all times. Right  IJ access is patent. IV access in right hand is present. Hand is puffy and patency not checked at this time. Healing skin tear on left forearm. Kimble catheter is draining clear yellow urine. Buttocks area is reddened and excoriated. BLE both have +1 edema. RR 20 and labored. Oxygen on via nasal canula at 2l/min. Temp ax is 100.6. Pt settled in bed with bed low and SR with HOB elevated.  is speaking with family at this time. 1553 Tylenol supp for temp 100.6 ax and morphine 2 mg IV for pain FLACC 2 and dyspnea. 80  Family present in room and orientation to [facility] given. Questions answered.  voices understanding of treatment plan. Pt appears peaceful and settled. 1845 No further PRN medications have been needed this shift.  Report given to oncoming RN

## 2019-09-18 NOTE — [facility] IDG NURSE NOTES
Patient: Radha Ann    Date: 09/18/19  Time: 5:33 PM    [facility] Nurse Notes  Pt admitted to [facility] for GIP level of care with hospice dx of adenocarcinoma of the pancreas and symptoms of pain and dyspnea. On admission pt is responsive to pain. Moans and groans on movement with a FLACC score of 2. Eyes are closed at all times. Right  IJ access is patent. IV access in right hand is present. Hand is puffy and patency not checked at this time. Healing skin tear on left forearm. Kimble catheter is draining clear yellow urine. Buttocks area is reddened and excoriated. BLE both have +1 edema. RR 20 and labored. Oxygen on via nasal canula at 2l/min. Temp ax is 100.6. Pt settled in bed with bed low and SR with HOB elevated.  is speaking with family at this time. Tylenol supp for temp 100.6 ax and morphine 2 mg IV for pain FLACC 2 and dyspnea. Family present in room and orientation to [facility] given. Questions answered.  voices understanding of treatment plan. Pt appears peaceful and settled. Admission complete and initial general hospice care plan initiated which includes spiritual, psychosocial and bereavement. Immediate needs recognized for symptom management . IDG team made aware of plan of care and immediate needs.            Signed by: Ana Snow RN

## 2019-09-19 NOTE — PROGRESS NOTES
Problem: Falls - Risk of  Goal: *Absence of Falls  Description  Document Nicole Salgado Fall Risk and appropriate interventions in the flowsheet. Outcome: Progressing Towards Goal  Note:   Fall Risk Interventions:  Mobility Interventions: Bed/chair exit alarm    Mentation Interventions: Adequate sleep, hydration, pain control, Bed/chair exit alarm, Door open when patient unattended, Family/sitter at bedside, Room close to nurse's station    Medication Interventions: Bed/chair exit alarm    Elimination Interventions: Bed/chair exit alarm, Call light in reach    History of Falls Interventions: Bed/chair exit alarm, Door open when patient unattended, Room close to nurse's station         Problem: Pressure Injury - Risk of  Goal: *Prevention of pressure injury  Description  Document Salvatore Scale and appropriate interventions in the flowsheet.   Outcome: Progressing Towards Goal  Note:   Pressure Injury Interventions:  Sensory Interventions: Assess changes in LOC, Check visual cues for pain, Float heels, Minimize linen layers, Pad between skin to skin, Pressure redistribution bed/mattress (bed type)    Moisture Interventions: Absorbent underpads, Apply protective barrier, creams and emollients, Internal/External urinary devices, Limit adult briefs, Maintain skin hydration (lotion/cream), Minimize layers, Moisture barrier    Activity Interventions: Pressure redistribution bed/mattress(bed type)    Mobility Interventions: Float heels, HOB 30 degrees or less, Pressure redistribution bed/mattress (bed type)    Nutrition Interventions: (Pt is NPO at this time)    Friction and Shear Interventions: Apply protective barrier, creams and emollients, HOB 30 degrees or less, Lift sheet, Minimize layers                Problem: Pain  Goal: Verbalize satisfaction of level of comfort and symptom control  Description  Pt will remain free of pain during stay at [facility] aeb FLACC score of 2 or less    Medication:  Dilaudid 0.5 mg IV/SQ q 30 minutes PRN pain    Outcome: Progressing Towards Goal     Problem: Infection - Risk of, Urinary Catheter-Associated Urinary Tract Infection  Goal: *Absence of infection signs and symptoms  Outcome: Progressing Towards Goal     Problem: Infection - Risk of, Central Venous Catheter-Associated Bloodstream Infection  Goal: *Absence of infection signs and symptoms  Outcome: Progressing Towards Goal     Problem: Pain  Goal: *Control of Pain  Outcome: Progressing Towards Goal

## 2019-09-19 NOTE — [facility] IDG VOLUNTEER NOTES
Patient: Patricia Davila    Date: 09/19/19  Time: 9:50 AM    [facility] Volunteer Notes  VC has reviewed the initial RN Comprehensive assessment and the plan of care. VC is in agreement with the plan of care. Pt will receive general support by volunteers as evidenced by comfort bag delivery, snack cart, supplies to the room, companionship visits, pet therapy and/or therapeutic music.          Signed by: Gege Mckinney

## 2019-09-19 NOTE — [facility] IDG CHAPLAIN NOTES
Patient: Kimberlyn Robles    Date: 09/19/19  Time: 10:33 AM    [facility]  Notes   has reviewed the Initial Comprehensive Assessment and Initial Plan of Care for  [address] [place] [address] is in  agreement with the assessment and plans set forth.  will assess for spiritual and bereavement needs and provide care as appropriate.         Signed by: Rosa Adrian

## 2019-09-19 NOTE — PROGRESS NOTES
Mrs. Anival West is resting this morning. Her sons says she is much more peaceful than she was at the hospital.  He expressed appreciation for the staff working diligently to get her comfortable.

## 2019-09-19 NOTE — PROGRESS NOTES
Circumstances for Admission: Pt was admitted to [facility] from [facility] on 9-18-19. She has a hospice diagnosis of Pancreatic cancer. She was transported to room 119 via ambulance. This is an order to admit [place] to inpatient hospice care at the [address], for a third hospice benefit interval. 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 An attempted Whipple resection was  Aborted when it became apparent. 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 She received a course of palliative chemotherapy through the [facility] and subsequently elected to stop disease modifying therapy and begin hospice care with plan to limit care to comfort measures only. The patient revoked hospice participation and was admitted to [facility] for treatment of neutropenic fever, by lobar pneumonia and Klebsiella pneumoniae septicemia. Her white count eventually georgiana to 29,000 and  Fever abated her need for norepinephrine and vasopressin infusion to maintain blood pressure also resolved. Unfortunately, she's developed acute renal failure and fluid overload. She has had increasing obtundation and has slipped from decisional capacity. Her family has elected on her behalf to stop life extending measures and to limit further care to comfort measures only. She is continuing to require parenteral opioid for dyspnea and pain as well as parenteral antiemetic and psychotropic medication for symptom management. Her life expectancy under these circumstances is less than 10 days. My prognosis is based in part results of face-to-face evaluation of this patient on the day of admission as well as review of her medical record. Patient-Family/Social History: Pt is a 66year old  woman. She has been  to her spouse Kolby Brooks for 50 years. They have 3 children. Alesia Bergeron and [facility]. Stevenson Kat is the only child that lives locally. Damien Carias lives in [facility] and Nicole lives in [address] [place] [address]. Pt did not serve in the [employer], but her spouse did. She worked as an . Pt loves to [address] and her grandchildren. Payor Source: Medicare. Financial / History: Pt and her spouse are financially stable. Spritiual Needs:  Pt is well supported by their [address]. Home Environment: Pt lives in her home with her spouse. They had Hospice in for her and Home health for the spouse. They also had a . Patient's Emotional and Cognitive Status: Pt is obtunded and not responding to voices in the room. Primary Caregiver and Resources used/needed at home Primary caregiver was spouse. With assistance of the hospice program she was enrolled in. Advance Directive/HCPOA / DNR Status: Pt has a living will on file. The HCPOA in her chart was her spouses. LMSW made the proper people aware of the error and will be fixed. Final Arrangements: Family has not made arrangements. They know she wants cremation. LMSW provided Cremation resources to the daughter       Bereavement Risk: Normal grief reaction expected. Discharge Planning: Daughter signed the Inpatient Financial Agreement. The plan for the pt is to continue with symptom management. If the pt is to rebound the family will take her home for care with an in home hospice. Volunteer discussion:  Y    Referrals Made: Y community resources.   [facility]- Aid and attendance program and burial benefits (resources given)

## 2019-09-19 NOTE — PROGRESS NOTES
190:  [facility]. Patient ID by name and . Patient remains obtunded. No s/sx of pain, dyspnea, or agitation. Bed low and locked and all safety measures in place. :  Flushed lines as ordered. Assessment complete. 2349:  PRN Hydromorphone IV and PRN Haldol IV given prior to repositioning. Patient repositioned with help from [address]:  Patient continues resting with no s/sx of pain, agitation, or dyspnea. 0430:  VS taken and olsen catheter emptied. PRN Hydromorphone and Haldol IV given prior to repositioning. Repositioned with help from [facility]. Patient resting in bed with no s/sx of pain, agitation, or dyspnea. PRN medications given prior to any repositioning. All safety measures in place. Son asleep at the bedside. Report to Jeffry Avitia RN.

## 2019-09-19 NOTE — [facility] IDG SOCIAL WORKER NOTES
Patient: Jesse Mae    Date: 09/19/19  Time: 10:44 AM    [facility]  Notes  LMSW has read and LMSW is in agreement with the initial Rn assessment.        Signed by: Ethan Gomez

## 2019-09-19 NOTE — PROGRESS NOTES
Report received. Pt round. Pt identified by name. In bed with eyes closed. No s/s of pain, agitation or dyspnea. Bed low and SR up with tab alerts on. Family at bedside. 8385 Dilaudid 0.5 mg IV and haldol 2 mg IV as premedication prior to AM care. 1206 Pt resting with no s/s of discomfort at this time. Daughter at bedside. 1423 Pt resting with no s/s of pain, agitation or dyspnea. 1750 Pt appears peaceful    1825 No s/s of distress. No further PRN medications have been required today.      Report given to oncoming RN

## 2019-09-19 NOTE — PROGRESS NOTES
Background: Angelito Dominguez is 66years old. She has been  to her  Barrera Mirza for 50 plus years. They have three children, Lori Garcia and Charanjit segovia. Teresa Powell is the only son who is local. New luca lives in [place] and Nicole lives in [place] near the [place].  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 She worked primarily in [employer]. She is a Orthodox and adheres to the [employer]. She is a member of [facility] on [address] in [facility]. The Sinda Clam there is Rev. Angelika Millard. He is aware Mrs Natty Tobar is at [place] and is involved in her spiritual care. Assessment: Patient appears to be obtunded. Her  and Teresa Powell her son are at bedside. Mr. Natty Tobar says he is coping with his wife's impending death and has accepted the inevitable. He is at peace with her impending death and with the care she is receiving. He expressed appreciation for the care she is receiving . Son Teresa Powell says he and the family are getting what they need at the moment. The family is taking turns sitting with . Omero Flor was here last night. He and  spoke briefly this morning. This family appears to be coping effectively at this time. They  Have a clear understanding of patient's terminal state. While they are sad they are also realistic in the way the are approaching their grief. Mr. Natty Tobar identifies his grief response, however he sounds as if he speaks of it in past tense as if he has dealt with the grief already. Bereavement Counselor will continue to explore his perception of the grief journey. Plan: Continue to provide spiritual and emotional support along with grief counseling and support as appropriate.   See Care Plan

## 2019-09-19 NOTE — MED STUDENT NOTES
Progress Note    Patient: Felicity Gilford MRN: [id number]  SSN: [id number]    YOB: 1945  Age: 66 y.o. Sex: female      Admit Date: 9/18/2019    LOS: 1 day     Subjective:   Mrs. Tom Desir is resting comfortably in bed with eyes clothes. She is unresponsive to touch or voice. Currently she appears in no distress or discomfort. Nursing reports states the patient has been resting comfortably and has not required much intervention in terms of distress or discomfort. Yesterday she required 1 dose of acetaminophen 650 mg suppository for a 100.6 fever and one dose of Dilaudid 0.5mg IV q30min PRN for dyspnea. Her daughter is present at bedside and states she is statisfied with the care her mother is currently receiving. Review of Systems:  Review of systems not obtained due to patient factors. Objective:     Vitals:    09/18/19 1530 09/19/19 0530   BP: 119/62 123/55   Pulse: (!) 121 93   Resp: 20 16   Temp: (!) 100.6 °F (38.1 °C) 96.4 °F (35.8 °C)        Intake and Output:  Current Shift: 09/19 0701 - 09/19 1900  In: 30   Out: -   Last three shifts: 09/17 1901 - 09/19 0700  In: -   Out: 50 [Urine:50]    Physical Exam:   GENERAL: no distress, appears stated age  LUNG: clear to auscultation bilaterally  HEART: tachycardic, regular rhythm, S1, S2 normal, no R/G/M heard  ABDOMEN: a firm will demarcated mass is palpable in the R and L upper quadrants, the R and L lower quadrants are soft to palpation  EXTREMITIES: large contusion noted over dorsal right forearm, multiple contusions and 2 inch laceration noted on left dorsal forearm, dorsalis pedis pulses are absent bilaterally   SKIN: pale     Lab/Data Review:  No new labs resulted in the last 24 hours.         Assessment:     Principal Problem:    Adenocarcinoma of pancreas ([address] (9/18/2019)    Active Problems:    Septic shock ([address] (9/12/2019)      Bacteremia due to Gram-negative bacteria (9/14/2019)      Gram negative sepsis ([address] (9/18/2019)      Overview: Klebsiella            Hypotension (9/18/2019)      Acute renal failure (ARF) ([facility] (9/18/2019)      Lactic acidosis (9/18/2019)      Peritoneal carcinomatosis ([facility] (9/18/2019)        Plan:   1) Pain Management: The patient appears comfortable at this time. Currently, there are no adjustments to medications needed. We will continue to monitor the patient for signs of distress or discomfort and will adjust medications as needed.    2) Agitation Management: The patient does not appear agitated at this time. She has Haldol 2mg SC q1hr PRN for agitation. We will continue to monitor the patient for agitation and will adjust medications as needed. 3) Support: Support is be provided through multidisciplinary care team for patient and family. Current Facility-Administered Medications   Medication Dose Route Frequency    haloperidol lactate (HALDOL) injection 2 mg  2 mg SubCUTAneous Q1H PRN    Or    haloperidol lactate (HALDOL) injection 2 mg  2 mg IntraVENous Q1H PRN    acetaminophen (TYLENOL) suppository 650 mg  650 mg Rectal Q3H PRN    bisacodyl (DULCOLAX) suppository 10 mg  10 mg Rectal PRN    haloperidol lactate (HALDOL) injection 2 mg  2 mg SubCUTAneous Q1H PRN    Or    haloperidol lactate (HALDOL) injection 2 mg  2 mg IntraVENous Q1H PRN    glycopyrrolate (ROBINUL) injection 0.2 mg  0.2 mg IntraVENous Q4H PRN    HYDROmorphone (DILAUDID) syringe 0.5 mg  0.5 mg SubCUTAneous Q30MIN PRN    Or    HYDROmorphone (DILAUDID) syringe 0.5 mg  0.5 mg IntraVENous Q30MIN PRN    sodium chloride (NS) flush 10 mL  10 mL InterCATHeter Q12H    heparin (porcine) pf 300 Units  300 Units InterCATHeter Q12H    sodium chloride (NS) flush 10 mL  10 mL InterCATHeter PRN    heparin (porcine) pf 300 Units  300 Units InterCATHeter PRN       Signed By: Hussein Majano     September 19, 2019          *ATTENTION:  This note has been created by a medical student for educational purposes only.   Please do not refer to the content of this note for clinical decision-making, billing, or other purposes. Please see attending physicians note to obtain clinical information on this patient. *

## 2019-09-19 NOTE — FACE TO FACE
Face to Face Attestation Addendum to Recertification    Name Vik Walter   1945  MRN [id number]    Benefit Period: Benefit Period 3  Start Date: 2019  End Date: 2019  Hospice Diagnosis: Hospice terminal diagnosis:     Overlapping malignant neoplasm of pancreas ([facility] (C25.8)  Other Hospice diagnoses:     Peritoneal carcinomatosis ([facility] (C78.6, C80.1)     Septicemia due to Klebsiella pneumoniae ([facility] (A41.4)     Acute renal failure, unspecified acute renal failure type ([address] (N17.9)     Hypervolemia, unspecified hypervolemia type (E87.70)     Personal history of chemotherapy (Z92.21)     Dyspnea, unspecified type (R06.00)     Pain (R52)     Hypotension, unspecified hypotension type (I95.9)     Hypoxia (R09.02)     Lactic acidosis (E87.2)     Obtundation (R40.1)       [place] is a 66year old female who has a hospice diagnosis of metastatic pancreatic adenocarcinoma. Hospice associated diagnoses are gram negative bacteremia with sepsis, hypotension, acute renal failure, hypoxia and lactic acidosis. Non-associated diagnoses are congestive heart failure, ASHD and PVD. She was diagnosed with highly anaplastic pancreatic adenocarcinoma in 2019. She was taken to the OR to perform a Whipple procedure that was aborted due to extensive metastases found with peritoneal carcinomatosis. She received palliative chemotherapy and then decided to pursue hospice care. She revoked hospice care 19 to receive treatment for pneumonia. She was found to have positive blood cultures for Klebsiella pneumoniae. IV antibiotics were given to treat her pneumonia and her white count improved. She unfortunately continued to have hypotension despite pressor support. She has developed renal failure with creatinine of 4.75 and now has fluid overload. She has lapsed into an obtunded state due to renal failure and is no longer able to take in food, fluids or po medications.  Due to her recent decline, her family has elected to forgo further medical treatment and pursue comfort measures with hospice care. Without further treatment, her life expectancy is less than 7 days. Patient admitted GIP with adenocarcinoma of the pancreas for management of pain, dyspnea and agitation. I confirm that I had a face to face encounter with Pastor Carranza on 9/18/19. The clinical findings of that encounter have been provided to the certifying physician for use in determining continued eligibility for hospice care.     Sorin Castaneda NP  9/18/2019

## 2019-09-19 NOTE — HSPC IDG CHAPLAIN NOTES
Patient: Arley Goode    Date: 09/18/19  Time: 11:55 PM    [facility]  Notes   has reviewed the Initial Comprehensive Assessment and Initial Plan of Care for  [address] is in  agreement with the assessment and plans set forth.  will assess for spiritual and bereavement needs and provide care as appropriate.              Signed by: Goldy Aguilera

## 2019-09-19 NOTE — PROGRESS NOTES
Problem: Falls - Risk of  Goal: *Absence of Falls  Description  Document Nirali Miller Fall Risk and appropriate interventions in the flowsheet. Outcome: Progressing Towards Goal  Note:   Fall Risk Interventions:  Mobility Interventions: Bed/chair exit alarm    Mentation Interventions: Adequate sleep, hydration, pain control, Door open when patient unattended, Toileting rounds, Bed/chair exit alarm, Evaluate medications/consider consulting pharmacy    Medication Interventions: Bed/chair exit alarm, Evaluate medications/consider consulting pharmacy    Elimination Interventions: Bed/chair exit alarm, Call light in reach, Toileting schedule/hourly rounds    History of Falls Interventions: Bed/chair exit alarm, Door open when patient unattended, Room close to nurse's station         Problem: Pressure Injury - Risk of  Goal: *Prevention of pressure injury  Description  Document Salvatore Scale and appropriate interventions in the flowsheet.   Outcome: Progressing Towards Goal  Note:   Pressure Injury Interventions:  Sensory Interventions: Assess changes in LOC, Check visual cues for pain, Float heels, Assess need for specialty bed    Moisture Interventions: Absorbent underpads, Moisture barrier, Maintain skin hydration (lotion/cream), Assess need for specialty bed, Apply protective barrier, creams and emollients, Limit adult briefs, Check for incontinence Q2 hours and as needed    Activity Interventions: Assess need for specialty bed    Mobility Interventions: Float heels, HOB 30 degrees or less, Pressure redistribution bed/mattress (bed type)    Nutrition Interventions: (Pt not taking PO at this time)    Friction and Shear Interventions: HOB 30 degrees or less, Apply protective barrier, creams and emollients, Lift sheet, Minimize layers                Problem: Infection - Risk of, Urinary Catheter-Associated Urinary Tract Infection  Goal: *Absence of infection signs and symptoms  Outcome: Progressing Towards Goal     Problem: Infection - Risk of, Central Venous Catheter-Associated Bloodstream Infection  Goal: *Absence of infection signs and symptoms  Outcome: Progressing Towards Goal     Problem: Pain  Goal: *Control of Pain  Outcome: Progressing Towards Goal  Note:   [place] will pain control AEB a FLACC score of 2 or less.

## 2019-09-19 NOTE — PROGRESS NOTES
Introductory visit with patient and family during patient's initial admission. Mrs. Oskar Monet was getting settled in while  spoke to her family in the common area.  assured family of support and offered prayer with family. Once nurse's had her settled  took the family back to her room.  provided family with a coping of the booklet, Gone From My Sight. Family asked if  could see if a nurse would be coming by soon.  left the room, spoke to JORGE Thorpe who confirmed she was going to see the patient this evening.  returned to the room to let the family know.

## 2019-09-19 NOTE — PROGRESS NOTES
Problem: Pshychosocial General  Goal: Psychosocial Needs  Description  Problem: SW: Patient Standard/ Initial Plan of Care-Social Work    Goals: 1. Patient and family will experience a smooth and safe transition to hospice care   2. Long Term Goal: To minimize pain and discomfort while maximizing quality of life for the \  patient and providing support for the family /patient caregiver. Intervention: 1. Social Work Assessment            2. Assess patient /family/ caregiver expectations from hospice. 3. Assess desires regarding end-of-life issues. 4. Assess degrees and stages of grief. 5. Assessment of pain and coping at every visit. 6. Assess the patient psycho-social, emotional cultural implications of pain. 7. Assess mental/ emotional status and need for additional resources every visit. 8. Assess PCG anxiety and fatigue in care plan delivery.       Outcome: Progressing Towards Goal     Problem: Advance Directive, Legal Document Needs  Goal: Patient will fill out appropriate document(s)  Description  Problem:  Planning for Death    Goals:     home will be chosen / arrangements will be made    Interventions:  Assess desires regarding end of life issues  Instruct and support the patient / family through the dying process  Encourage ventilate of feelings / fears  Assist with  home arrangements     Outcome: Progressing Towards Goal  Note:   Family was given community resources for cremation

## 2019-09-20 NOTE — MED STUDENT NOTES
Progress Note    Patient: Oziel Goff MRN: [id number]  SSN: [id number]    YOB: 1945  Age: 66 y.o. Sex: female      Admit Date: 9/18/2019    LOS: 2 days     Subjective:   Mrs. Rodriguez Linecheng is laying in bed with eyes closed. Presently she appears to be in no distress or discomfort. She is unresponsive to touch or voice. Her son is present at the bedside. According to his report, she remains in no distress or discomfort unless she is moved. He is currently happy with the care his mother is receiving and stated he had no further questions. Yesterday she received 3 doses of Haldol 2mg IV Q1H PRN for agitation and 3 doses of Dilaudid 0.5mg IV Q30min PRN. She has a olsen catheter placed with output of 75mL yesterday. Review of Systems:  Review of systems not obtained due to patient factors. Objective:     Vitals:    09/18/19 1530 09/19/19 0530 09/19/19 1803 09/20/19 0430   BP: 119/62 123/55 120/58 94/55   Pulse: (!) 121 93 (!) 106 (!) 113   Resp: 20 16 22 18   Temp: (!) 100.6 °F (38.1 °C) 96.4 °F (35.8 °C) 100.1 °F (37.8 °C) 100.1 °F (37.8 °C)        Intake and Output:  Current Shift: No intake/output data recorded. Last three shifts: 09/18 1901 - 09/20 0700  In: 30   Out: 125 [Urine:125]    Physical Exam:   GENERAL: no distress, appears stated age  LUNG: clear to auscultation bilaterally, labored breathing  HEART: tachycardic, regular rhythm, S1, S2 normal, no R/G/M heard  ABDOMEN: a firm will demarcated mass is palpated along the midline in the upper abdomen, the R and L lower quadrants are soft to palpation  EXTREMITIES: large contusion noted over dorsal right forearm, multiple contusions and 2 inch laceration noted on left dorsal forearm, dorsalis pedis pulses are absent bilaterally   SKIN: pale     Lab/Data Review:  No new labs resulted in the last 24 hours.         Assessment:     Principal Problem:    Adenocarcinoma of pancreas ([address] (9/18/2019)    Active Problems:    Septic shock ([facility] (9/12/2019)      Bacteremia due to Gram-negative bacteria (9/14/2019)      Gram negative sepsis ([facility] (9/18/2019)      Overview: Klebsiella            Hypotension (9/18/2019)      Acute renal failure (ARF) ([facility] (9/18/2019)      Lactic acidosis (9/18/2019)      Peritoneal carcinomatosis ([facility] (9/18/2019)        Plan:   1) Pain Management: Mrs. Tulio Mottay comfortable at this time. Currently, there are no adjustments to medications needed. Mrs. Reinaldo Sharpe has Dilaudid 0.5mg PRN Q30min IV for pain. She is unresponsive therefor facial expressions and vital signs are being used as indicators of patient's distress. We will continue to monitor the patient for signs of distress or discomfort and will adjust medications as needed.     2) Agitation Management: Mrs. Reinaldo Sharpe does not appear agitated at this time. She has Haldol 2mg SC q1hr PRN for agitation. Facial expressions and vital signs as being used as indicators of agitation as she is unresponsive. We will continue to monitor the patient for agitation and will adjust medications as needed.       3) Support: Support is be provided through multidisciplinary care team for patient and family.    Current Facility-Administered Medications   Medication Dose Route Frequency    haloperidol lactate (HALDOL) injection 2 mg  2 mg SubCUTAneous Q1H PRN    Or    haloperidol lactate (HALDOL) injection 2 mg  2 mg IntraVENous Q1H PRN    acetaminophen (TYLENOL) suppository 650 mg  650 mg Rectal Q3H PRN    bisacodyl (DULCOLAX) suppository 10 mg  10 mg Rectal PRN    haloperidol lactate (HALDOL) injection 2 mg  2 mg SubCUTAneous Q1H PRN    Or    haloperidol lactate (HALDOL) injection 2 mg  2 mg IntraVENous Q1H PRN    glycopyrrolate (ROBINUL) injection 0.2 mg  0.2 mg IntraVENous Q4H PRN    HYDROmorphone (DILAUDID) syringe 0.5 mg  0.5 mg SubCUTAneous Q30MIN PRN    Or    HYDROmorphone (DILAUDID) syringe 0.5 mg  0.5 mg IntraVENous Q30MIN PRN    sodium chloride (NS) flush 10 mL 10 mL InterCATHeter Q12H    heparin (porcine) pf 300 Units  300 Units InterCATHeter Q12H    sodium chloride (NS) flush 10 mL  10 mL InterCATHeter PRN    heparin (porcine) pf 300 Units  300 Units InterCATHeter PRN       Signed By: Radha Rich     September 20, 2019          *ATTENTION:  This note has been created by a medical student for educational purposes only. Please do not refer to the content of this note for clinical decision-making, billing, or other purposes. Please see attending physicians note to obtain clinical information on this patient. *

## 2019-09-20 NOTE — PROGRESS NOTES
Problem: Pshychosocial General  Goal: Psychosocial Needs  Description  Problem: SW: Patient Standard/ Initial Plan of Care-Social Work    Goals: 1. Patient and family will experience a smooth and safe transition to hospice care   2. Long Term Goal: To minimize pain and discomfort while maximizing quality of life for the \  patient and providing support for the family /patient caregiver. Intervention: 1. Social Work Assessment            2. Assess patient /family/ caregiver expectations from hospice. 3. Assess desires regarding end-of-life issues. 4. Assess degrees and stages of grief. 5. Assessment of pain and coping at every visit. 6. Assess the patient psycho-social, emotional cultural implications of pain. 7. Assess mental/ emotional status and need for additional resources every visit. 8. Assess PCG anxiety and fatigue in care plan delivery.       Outcome: Progressing Towards Goal     Problem: Advance Directive, Legal Document Needs  Goal: Patient will fill out appropriate document(s)  Description  Problem:  Planning for Death    Goals:     home will be chosen / arrangements will be made    Interventions:  Assess desires regarding end of life issues  Instruct and support the patient / family through the dying process  Encourage ventilate of feelings / fears  Assist with  home arrangements     2019 1108 by Abby BARNARD  Outcome: Progressing Towards Goal

## 2019-09-20 NOTE — [facility] IDG NURSE NOTES
Patient: Kwame Bonner    Date: 09/20/19  Time: 12:19 PM    [facility] Nurse Notes  Pt is febrile 100.1. Kimble with 75 mls in last 24 hours. BP 95/55. Dilaudid 0.5 x 3, Haldol 2 mg x 3 doses in the last 24 hours for pain and agitation. Pt is NPO day 4 post IVF. Excoriation to buttocks. R IJ is being utilized for mediation administration. Pt is obtunded. Skin tear to L upper arm. Comprehensive plan of care reviewed. IDG and pt./family in agreement with plan of care. The IDG identifies through on-going assessment when a change is needed to the POC; the pt/family will receive care and services necessitated by changes in POC. Medications reviewed by the pharmacist and Medical Director.            Signed by: Belle Garzon RN

## 2019-09-20 NOTE — [facility] IDG SOCIAL WORKER NOTES
Patient: Natalee Martinez    Date: 19  Time: 8:49 AM    [facility] [place]  Notes  LMSW will continue to provide emotional support. Family has been given cremation resources for them to make a decision. [facility]. Aid and attendance for the  spouse of the pt. According to the children their father is declining. Problem: Pshychosocial General  Goal: Psychosocial Needs  Description  Problem: SW: Patient Standard/ Initial Plan of Care-Social Work    Goals: 1. Patient and family will experience a smooth and safe transition to hospice care   2. Long Term Goal: To minimize pain and discomfort while maximizing quality of life for the \  patient and providing support for the family /patient caregiver. Intervention: 1. Social Work Assessment            2. Assess patient /family/ caregiver expectations from hospice. 3. Assess desires regarding end-of-life issues. 4. Assess degrees and stages of grief. 5. Assessment of pain and coping at every visit. 6. Assess the patient psycho-social, emotional cultural implications of pain. 7. Assess mental/ emotional status and need for additional resources every visit. 8. Assess PCG anxiety and fatigue in care plan delivery.       Outcome: Progressing Towards Goal     Problem: Advance Directive, Legal Document Needs  Goal: Patient will fill out appropriate document(s)  Description  Problem:  Planning for Death    Goals:     home will be chosen / arrangements will be made    Interventions:  Assess desires regarding end of life issues  Instruct and support the patient / family through the dying process  Encourage ventilate of feelings / fears  Assist with  home arrangements     2019 1108 by Gissell Franco  Outcome: Progressing Towards Goal       Signed by: Ajay Ventura

## 2019-09-20 NOTE — PROGRESS NOTES
1925 Received report from Cehrry Bear RN. Safety measures intact. Bed in low, locked position and tab alert intact. Room close to nursing station. No s/sx of pain or distress. Pt alone, door open for safety. Kimble intact and draining. RIJ and dressing intact. Needs anticipated and met by staff. Symptoms managed per orders. 2200 Pt medicated for agonal/labored  breathing with Dilaudid. Son at bedside for the night. 2230 Pt resting quietly respirations unlabored. Temperature taken per request 98.9. No distress noted. Will continue to monitor and treat symptoms PRN.  0300 Pt sleeping soundly with son at bedside, no s/sx of distress. 8944 Pt son reported pt respirations sound different. Pt assessed and administered glyco for gurgling. [address] [place] patient was found to have no audible or palpable heart rate, no visual or audible respirations and unresponsive to tactile stimuli [id number]. Son at bedside grieving appropriately. He will notify the POA, his father and his sister. 1604 Pt father and sister in to visit with Mrs. Annalee Turner and father has given me permission to release the body to [employer] [facility] [place] [address] and informed them  Of the families request. They will call us back with a specific time for pick-up. Will report to oncoming nurse.

## 2019-09-20 NOTE — [facility] IDG CHAPLAIN NOTES
Patient: Patricia Davila    Date: 09/20/19  Time: 12:19 PM    \[facility] [place] [facility]\""  Notes  Intervention: Initial spiritual and bereavement assessments completed. Literature provided. Exploration of family coping. Compassion, affirmation of isra and prayer. Outcome: Family expressed acceptance of patient's impending death. Family open to spiritual support. They have pastoral support from their Confucianism. Plan:  Continued support during patient's stay with [facility].   See Care Plan        Signed by: Hansel Hardwick

## 2019-09-20 NOTE — [facility] IDG VOLUNTEER NOTES
Patient: Karuna Hill    Date: 09/20/19  Time: 11:20 AM    [facility] [place] Volunteer Notes  Volunteer - Hospice Interdisciplinary Plan of Care Review     Status Codes C=Continued R=Revised RS = Resolved  NV= No visits per Infection Control Policy     C. Volunteer     Volunteers continue to provide visits for companionship and emotional support.              Signed by: Ray Garcia

## 2019-09-20 NOTE — PROGRESS NOTES
Problem: Falls - Risk of  Goal: *Absence of Falls  Description  Document aCthi Valladares Fall Risk and appropriate interventions in the flowsheet. Outcome: Progressing Towards Goal  Note:   Fall Risk Interventions:  Mobility Interventions: Bed/chair exit alarm    Mentation Interventions: Adequate sleep, hydration, pain control, Bed/chair exit alarm, Door open when patient unattended, More frequent rounding, Toileting rounds, Evaluate medications/consider consulting pharmacy    Medication Interventions: Bed/chair exit alarm, Evaluate medications/consider consulting pharmacy    Elimination Interventions: Bed/chair exit alarm, Call light in reach, Toileting schedule/hourly rounds    History of Falls Interventions: Bed/chair exit alarm, Door open when patient unattended, Room close to nurse's station         Problem: Pressure Injury - Risk of  Goal: *Prevention of pressure injury  Description  Document Salvatore Scale and appropriate interventions in the flowsheet.   Outcome: Progressing Towards Goal  Note:   Pressure Injury Interventions:  Sensory Interventions: Assess changes in LOC, Check visual cues for pain, Float heels, Assess need for specialty bed    Moisture Interventions: Absorbent underpads, Assess need for specialty bed, Maintain skin hydration (lotion/cream), Moisture barrier, Apply protective barrier, creams and emollients, Check for incontinence Q2 hours and as needed, Limit adult briefs    Activity Interventions: Assess need for specialty bed    Mobility Interventions: Assess need for specialty bed, Float heels, HOB 30 degrees or less    Nutrition Interventions: (Pt is NPO at this time)    Friction and Shear Interventions: Apply protective barrier, creams and emollients, HOB 30 degrees or less, Lift sheet, Minimize layers                Problem: Anticipatory Grief  Goal: Explore reactions to and verbalize acceptance of impending loss  Description  Patient/family/caregiver will explore reactions to and verbalize acceptance of impending loss. Outcome: Progressing Towards Goal     Problem: Coping and Emotional Distress  Goal: Demonstrate acceptance of terminal illness and understanding of disease progression  Description  Patient/family/caregiver will demonstrate acceptance of terminal disease and understanding of disease progression while employing appropriate coping mechanisms. Outcome: Progressing Towards Goal     Problem: Spiritual Distress  Goal: Distress heard, acknowledged, and addressed  Description  Patient/family/caregiver distress will be heard, acknowledged, and addressed throughout hospice care. Outcome: Progressing Towards Goal     Problem: End of Life Process  Goal: Demonstrate understanding of end of life processes  Description  Patient/caregiver will understand end of life processes.   Outcome: Progressing Towards Goal     Problem: Infection - Risk of, Urinary Catheter-Associated Urinary Tract Infection  Goal: *Absence of infection signs and symptoms  Outcome: Progressing Towards Goal     Problem: Infection - Risk of, Central Venous Catheter-Associated Bloodstream Infection  Goal: *Absence of infection signs and symptoms  Outcome: Progressing Towards Goal     Problem: Pain  Goal: *Control of Pain  Outcome: Progressing Towards Goal

## 2019-09-20 NOTE — PROGRESS NOTES
Report received. Pt round. Pt identified by name. In bed with eyes closed. No s/s of pain, agitation or dyspnea. Bed low and SR up with tab alerts on. Son at bedside. 1012 Dilaudid 0.5 mg IV and Haldol 2 mg IV as premedication prior to AM care. Pt moaning when turned. Temp 100.2 ax. Tylenol supp for temp. 1300 Temp 100.1 ax. Cool cloth applied to face and covers pulled back. 1555 Tylenol supp for temp 100.5    1800 Pt resting peacefully with family at bedside.     Report given to oncoming RN

## 2019-09-20 NOTE — PROGRESS NOTES
Problem: Falls - Risk of  Goal: *Absence of Falls  Description  Document Larry Sifuentes Fall Risk and appropriate interventions in the flowsheet. Outcome: Progressing Towards Goal  Note:   Fall Risk Interventions:  Mobility Interventions: Bed/chair exit alarm    Mentation Interventions: Adequate sleep, hydration, pain control, Bed/chair exit alarm, Door open when patient unattended, Familiar objects from home, Family/sitter at bedside, Room close to nurse's station    Medication Interventions: Bed/chair exit alarm    Elimination Interventions: Bed/chair exit alarm, Call light in reach    History of Falls Interventions: Bed/chair exit alarm, Door open when patient unattended, Room close to nurse's station         Problem: Pressure Injury - Risk of  Goal: *Prevention of pressure injury  Description  Document Salvatore Scale and appropriate interventions in the flowsheet.   Outcome: Progressing Towards Goal  Note:   Pressure Injury Interventions:  Sensory Interventions: Assess changes in LOC, Check visual cues for pain, Float heels, Keep linens dry and wrinkle-free, Maintain/enhance activity level, Minimize linen layers, Pad between skin to skin, Pressure redistribution bed/mattress (bed type)    Moisture Interventions: Absorbent underpads, Apply protective barrier, creams and emollients, Internal/External urinary devices, Limit adult briefs, Maintain skin hydration (lotion/cream), Minimize layers, Moisture barrier    Activity Interventions: Pressure redistribution bed/mattress(bed type)    Mobility Interventions: Float heels, HOB 30 degrees or less, Pressure redistribution bed/mattress (bed type)    Nutrition Interventions: (pt not taking PO at this time)    Friction and Shear Interventions: Apply protective barrier, creams and emollients, HOB 30 degrees or less, Lift sheet, Minimize layers                Problem: Pain  Goal: Verbalize satisfaction of level of comfort and symptom control  Description  Pt will remain free of pain during stay at [facility] aeb FLACC score of 2 or less    Medication:  Dilaudid 0.5 mg IV/SQ q 30 minutes PRN pain    Outcome: Progressing Towards Goal     Problem: Infection - Risk of, Urinary Catheter-Associated Urinary Tract Infection  Goal: *Absence of infection signs and symptoms  Outcome: Progressing Towards Goal     Problem: Infection - Risk of, Central Venous Catheter-Associated Bloodstream Infection  Goal: *Absence of infection signs and symptoms  Outcome: Progressing Towards Goal     Problem: Pain  Goal: *Control of Pain  Outcome: Resolved/Not Met  Note:   DUPLICATE GOAL

## 2019-09-20 NOTE — [facility] IDG MASTER NOTE
Hospice Inter[facility] Collabor[facility]  Date: 09/20/19  Time: 1:27 PM    ___________________    Patient: Veronica Almonte  Coverage Information:     Payor: [facility] MEDICARE     Plan: [facility] MEDICARE PART A AND B     Subscriber ID: [id number]     Phone Number:   MRN: [id number]  Current Benefit Period: Benefit Period 3  Start Date: 9/18/2019  End Date: 11/16/2019      Medical Director:   Hospice Attending Provider: Beryl Torres [address] [place] [address]  [id number]  Phone: [phone]  Fax: [phone]    Level of Care: General Inpatient Care      ___________________    Diagnoses: There were no encounter diagnoses.     Current Medications:    Current Facility-Administered Medications:     haloperidol lactate (HALDOL) injection 2 mg, 2 mg, SubCUTAneous, Q1H PRN **OR** haloperidol lactate (HALDOL) injection 2 mg, 2 mg, IntraVENous, Q1H PRN, Hugh Betancur NP    acetaminophen (TYLENOL) suppository 650 mg, 650 mg, Rectal, Q3H PRN, Hugh Betancur, NP, 650 mg at 09/20/19 1012    bisacodyl (DULCOLAX) suppository 10 mg, 10 mg, Rectal, PRN, Hugh Betancur, NP    haloperidol lactate (HALDOL) injection 2 mg, 2 mg, SubCUTAneous, Q1H PRN **OR** haloperidol lactate (HALDOL) injection 2 mg, 2 mg, IntraVENous, Q1H PRN, Hugh Betancur, NP, 2 mg at 09/20/19 1004    glycopyrrolate (ROBINUL) injection 0.2 mg, 0.2 mg, IntraVENous, Q4H PRN, Hugh Betancur, NP    HYDROmorphone (DILAUDID) syringe 0.5 mg, 0.5 mg, SubCUTAneous, Q30MIN PRN **OR** HYDROmorphone (DILAUDID) syringe 0.5 mg, 0.5 mg, IntraVENous, Q30MIN PRN, Hugh Betancur NP, 0.5 mg at 09/20/19 1005    sodium chloride (NS) flush 10 mL, 10 mL, InterCATHeter, Q12H, Harlene Betancur, NP, 10 mL at 09/20/19 1005    heparin (porcine) pf 300 Units, 300 Units, InterCATHeter, Q12H, Harlene Betancur, NP, 300 Units at 09/20/19 1005    sodium chloride (NS) flush 10 mL, 10 mL, InterCATHeter, PRN, Harlene Betancur, NP    heparin (porcine) pf 300 Units, 300 Units, InterCATHeter, PRN, Sumanth Franks NP    Orders:  Orders Placed This Encounter    IP CONSULT TO SPIRITUAL CARE Once on week one, then PRN. For [facility] patients only. For contracted patients, primary hospice will continue to manage spiritual care needs     Once on week one, then PRN. For [facility] patients only. For contracted patients, primary hospice will continue to manage spiritual care needs     Standing Status:   Standing     Number of Occurrences:   1     Order Specific Question:   Reason for Consult: Answer:   Spiritual crisis intervention or per patient or caregiver request    DIET PLEASURE     Standing Status:   Standing     Number of Occurrences:   1     Order Specific Question:   Likes/Dislikes/Preferences     Answer:   hold tray    VITAL SIGNS     Standing Status:   Standing     Number of Occurrences:   1    VITAL SIGNS     Standing Status:   Standing     Number of Occurrences:   1    NURSING-MISCELLANEOUS: comfort measures Enter comfort measures above. CONTINUOUS     Enter comfort measures above. Standing Status:   Standing     Number of Occurrences:   1     Order Specific Question:   Description of Order:     Answer:   comfort measures    KUHN CATHETER, CARE     1. Kuhn Catheter care every shift and PRN  2. Notify Physician of Kuhn Catheter leakage, occlusion, gross adherent sediment or accidental removal  3. Change Kuhn 30 days after insertion. 4. May flush catheter prn leakage or gross adherent sediment or mucus. Standing Status:   Standing     Number of Occurrences:   1    BLADDER CHECKS     May scan bladder PRN for urinary retention and or patient discomfort     Standing Status:   Standing     Number of Occurrences:   1    NURSING ASSESSMENT:  SPECIFY Assess for GIP, routine, or respite level of care.  Q SHIFT Routine     Standing Status:   Standing     Number of Occurrences:   1     Order Specific Question:   Please describe the test or procedure you would like to order. Answer:   Assess for GIP, routine, or respite level of care.  PAIN ASSESSMENT Pain and Symptoms: Assess ever 4 hours and PRN, for GIP level of care. PRN Routine     Standing Status:   Standing     Number of Occurrences:   1     Order Specific Question:   Please describe the test or procedure you would like to order. Answer:   Pain and Symptoms: Assess ever 4 hours and PRN, for GIP level of care.  BEDREST, COMPLETE     Standing Status:   Standing     Number of Occurrences:   1    DRESSING CHANGE - PICC, MLC, SUBCUTANEOUS AND ACCESSED PORT DRESSING CHANGE     PICC, MLC, SUBCUTANEOUS AND ACCESSED PORT DRESSING CHANGE:  Change catheter site dressing every 5 days and prn if site dressing becomes damp loosened or visibly soiled       Standing Status:   Standing     Number of Occurrences:   1    WOUND CARE, DRESSING CHANGE     Wound Care:  Location: bilateral buttocks and perineum  Excoriation- Cleanse wound location with wound cleanser, pat dry and apply Antifungal Cream and Calmoseptine every 12 hours and PRN if soiled. Turn every 2 hours. Assess with each nursing assessment visit. Standing Status:   Standing     Number of Occurrences:   34    WOUND CARE, DRESSING CHANGE     Wound Care:  Location: left forearm  Skin tear: Please leave open to air. Assess every shift. Notify provider if wound develops drainage. Standing Status:   Standing     Number of Occurrences:   29    NURSING-MISCELLANEOUS: admit 9/18: (19 Crystal Almeida RN) Admitted GIP with metastatic pancreatic cancer for management of pain, dyspnea and agitation. This is an order to admit [place] to inpatient hospice care at the [address] [place] [address], for... 9/18: (19 Crystal Almeida RN) Admitted GIP with metastatic pancreatic cancer for management of pain, dyspnea and agitation.    This is an order to admit [place] to inpatient hospice care at the [address] [place] [address], for a third hospice benefit interval. 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 An attempted Whipple resection was  Aborted when it became apparent. 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 She received a course of palliative chemotherapy through the [facility] and subsequently elected to stop disease modifying therapy and begin hospice care with plan to limit care to comfort measures only. The patient revoked hospice participation and was admitted to [facility] for treatment of neutropenic fever, by lobar pneumonia and Klebsiella pneumoniae septicemia. Her white count eventually georgiana to 29,000 and  Fever abated her need for norepinephrine and vasopressin infusion to maintain blood pressure also resolved. Unfortunately, she's developed acute renal failure and fluid overload. She has had increasing obtundation and has slipped from decisional capacity. Her family has elected on her behalf to stop life extending measures and to limit further care to comfort measures only. She is continuing to require parenteral opioid for dyspnea and pain as well as parenteral antiemetic and psychotropic medication for symptom management. Her life expectancy under these circumstances is less than 10 days. My prognosis is based in part results of face-to-face evaluation of this patient on the day of admission as well as review of her medical record. Standing Status:   Standing     Number of Occurrences:   1     Order Specific Question:   Description of Order:     Answer:   admit    DO NOT RESUSCITATE     Standing Status:   Standing     Number of Occurrences:   1    OXYGEN CANNULA Liters per minute: 2; Indications for O2 therapy: RESPIRATORY DISTRESS PRN Routine     Standing Status:   Standing     Number of Occurrences:   1     Order Specific Question:   Liters per minute:      Answer:   2     Order Specific Question:   Indications for O2 therapy     Answer:   RESPIRATORY DISTRESS  DISCONTD: sodium chloride (NS) flush 3 mL    DISCONTD: sodium chloride (NS) flush 3 mL    OR Linked Order Group     haloperidol lactate (HALDOL) injection 2 mg     haloperidol lactate (HALDOL) injection 2 mg    acetaminophen (TYLENOL) suppository 650 mg    bisacodyl (DULCOLAX) suppository 10 mg    OR Linked Order Group     haloperidol lactate (HALDOL) injection 2 mg     haloperidol lactate (HALDOL) injection 2 mg    glycopyrrolate (ROBINUL) injection 0.2 mg    DISCONTD: morphine injection 2 mg    DISCONTD: morphine injection 2 mg    OR Linked Order Group     HYDROmorphone (DILAUDID) syringe 0.5 mg     HYDROmorphone (DILAUDID) syringe 0.5 mg    Bacillus coagulans (DIGESTIVE ADVANTAGE PO)     Sig: Take 1 Dose by mouth daily.  Biotin 2,500 mcg cap     Sig: Take 1 Cap by mouth daily.  Calcium Carbonate-Vit D3-Min 600 mg calcium- 400 unit tab     Sig: Take 1 Tab by mouth daily.  DISCONTD: cranberry fruit extract (CRANBERRY EXTRACT PO)     Sig: Take 1 Dose by mouth daily.  cranberry 400 mg cap     Sig: Take 400 mg by mouth daily.  levothyroxine (SYNTHROID) 75 mcg tablet     Sig: Take 75 mcg by mouth Daily (before breakfast).  rosuvastatin (CRESTOR) 20 mg tablet     Sig: Take 20 mg by mouth nightly.  tolterodine ER (DETROL LA) 4 mg ER capsule     Sig: Take 4 mg by mouth daily. Refill:  5    aspirin 81 mg chewable tablet     Sig: Take 81 mg by mouth daily.  carvedilol (COREG) 6.25 mg tablet     Sig: Take 6.25 mg by mouth two (2) times daily (with meals).  cyanocobalamin 1,000 mcg tablet     Sig: Take 1,000 mcg by mouth daily.  ergocalciferol (ERGOCALCIFEROL) 50,000 unit capsule     Sig: Take 50,000 Units by mouth two (2) days a week.  esomeprazole (NEXIUM) 20 mg capsule     Sig: Take 20 mg by mouth daily.  magnesium 250 mg tab     Sig: Take 250 mg by mouth daily.  meloxicam (MOBIC) 7.5 mg tablet     Sig: Take 7.5 mg by mouth daily.     tiZANidine (ZANAFLEX) 4 mg tablet     Sig: Take 4 mg by mouth nightly.  valsartan (DIOVAN) 160 mg tablet     Sig: Take 160 mg by mouth daily.  sodium chloride (NS) flush 10 mL    heparin (porcine) pf 300 Units    sodium chloride (NS) flush 10 mL    heparin (porcine) pf 300 Units    INITIAL PHYSICIAN ORDER: HOSPICE Level Of Care: General Inpatient; Reason for Admission: 9/18: (SFD) Admitted GIP with adenocarcinoma of the pancreas for management of pain, dyspnea and agitation. Standing Status:   Standing     Number of Occurrences:   1     Order Specific Question:   Status     Answer:   Hospice     Order Specific Question:   Level Of Care     Answer:   General Inpatient     Order Specific Question:   Reason for Admission     Answer:   9/18: (SFD) Admitted GIP with adenocarcinoma of the pancreas for management of pain, dyspnea and agitation. Order Specific Question:   Inpatient Hospitalization Certified Necessary for the Following Reasons     Answer:   3. Patient receiving treatment that can only be provided in an inpatient setting (further clarification in H&P documentation)     Order Specific Question:   Admitting Diagnosis     Answer: Adenocarcinoma of pancreas [facility]) [[id number]]     Order Specific Question:   Terminal Prognosis Diagnosis(es)     Answer: Adenocarcinoma of pancreas [facility]) [[id number]]     Order Specific Question:   Admitting Physician     Answer:   Sree Doing     Order Specific Question:   Attending Physician     Answer:   Sree Doing     Order Specific Question:   Discharge Plan:     Answer: Other (Specify)    IP CONSULT TO SOCIAL WORK     Once on week one, then PRN for Psychosocial crisis intervention or per patient or caregiver request.  For [facility] patients only. For contracted patients, primary hospice will continue to manage social work needs. Standing Status:   Standing     Number of Occurrences:   1     Order Specific Question:   Reason for Consult:      Answer: Once on week one, then PRN for Psychosocial crisis intervention or per patient or caregiver request.       Allergies: Allergies   Allergen Reactions    Sulfa (Sulfonamide Antibiotics) Hives     Other reaction(s): rash, hives                                     Penicillin Swelling     Other reaction(s): swelling                                        Care Plan:  Multidisciplinary Problems (Active)     Problem: Advance Directive, Legal Document Needs     Dates: Start: 19       Disciplines: Interdisciplinary    Goal: Patient will fill out appropriate document(s)     Dates: Start: 19   Expected End: 19       Description: Problem:  Planning for Death    Goals:     home will be chosen / arrangements will be made    Interventions:  Assess desires regarding end of life issues  Instruct and support the patient / family through the dying process  Encourage ventilate of feelings / fears  Assist with  home arrangements       Disciplines: Interdisciplinary                Problem: Anticipatory Grief     Dates: Start: 19       Description:     Disciplines: Interdisciplinary    Goal: Explore reactions to and verbalize acceptance of impending loss     Dates: Start: 19   Expected End: 19       Description: Patient/family/caregiver will explore reactions to and verbalize acceptance of impending loss. Disciplines: Interdisciplinary    Intervention: Assess grief responses     Dates: Start: 19       Description:           Intervention: Assist with grief counseling     Dates: Start: 19       Description:  to assist.          Intervention: Yadira Abdi on stages of grief     Dates: Start: 19       Description: Instruct patient/caregiver on stages of grief. Intervention: Offer grief support group     Dates: Start: 19       Description: Patient/family/caregiver will explore reactions to and verbalize acceptance of impending loss. Problem: Anticipatory Grief     Dates: Start: 09/19/19       Description:     Disciplines: Interdisciplinary    Goal: Grief heard and acknowledged, anxiety reduced, patient coping identified, patient/family expressed gratitude     Dates: Start: 09/19/19       Description:     Disciplines: Interdisciplinary    Intervention: Assess grief responses     Dates: Start: 09/19/19       Description: Assess for feelings of grief          Intervention: Support grieving process     Dates: Start: 09/19/19       Description: Address feelings of loss, anticipatory grief, expression of concern. Problem: Coping and Emotional Distress     Dates: Start: 09/18/19       Description:     Disciplines: Interdisciplinary    Goal: Demonstrate acceptance of terminal illness and understanding of disease progression     Dates: Start: 09/18/19   Expected End: 09/27/19       Description: Patient/family/caregiver will demonstrate acceptance of terminal disease and understanding of disease progression while employing appropriate coping mechanisms. Disciplines: Interdisciplinary    Intervention: Assess for alteration in coping     Dates: Start: 09/18/19       Description:           Intervention: Assess for signs/symptoms of emotional distress     Dates: Start: 09/18/19       Description:           Intervention: Instruct on effective coping skills     Dates: Start: 09/18/19       Description: Instruct patient/caregiver on effective coping skills. Intervention: Instruct on strategies to reduce emotional distress     Dates: Start: 09/18/19       Description: Instruct patient/caregiver on strategies to reduce emotional distress.                       Problem: End of Life Process     Dates: Start: 09/18/19       Description:     Disciplines: Interdisciplinary    Goal: Demonstrate understanding of end of life processes     Dates: Start: 09/18/19   Expected End: 09/27/19       Description: Patient/caregiver will understand end of life processes. Disciplines: Interdisciplinary    Intervention: Assess for signs/symptoms of terminal restlessness     Dates: Start: 09/18/19       Description:           Intervention: Instruct on strategies to reduce terminal restlessness     Dates: Start: 09/18/19       Description:           Intervention: Instruct on the dying process     Dates: Start: 09/18/19       Description:           Intervention: Instruct: imminent death     Dates: Start: 09/18/19       Description:           Intervention: Instruct: process at end of life     Dates: Start: 09/18/19       Description:                       Problem: Falls - Risk of     Dates: Start: 09/18/19       Description:     Disciplines: Interdisciplinary    Goal: *Absence of Falls     Dates: Start: 09/18/19   Expected End: 09/26/19       Description: Document Stanley Mitchell Fall Risk and appropriate interventions in the flowsheet. Disciplines: Interdisciplinary                Problem: Infection - Risk of, Central Venous Catheter-Associated Bloodstream Infection     Dates: Start: 09/18/19       Description: Pt will remain free of infection from central line during stay at [facility] aeb lack of s/s of infection.       Disciplines: Nurse, Interdisciplinary, RT    Goal: *Absence of infection signs and symptoms     Dates: Start: 09/18/19   Expected End: 09/26/19       Description:     Disciplines: Nurse, Interdisciplinary, RT    Intervention: Central venous catheter site(s) assessment (eg: Pain; color; exudate; edema; odor; skin integrity)     Dates: Start: 09/18/19       Description:           Intervention: Infection assessment -general (eg: Onset of weakness; white blood cells; shivering; hyper/hypoglycemia; temp/weight/energy/mental status altered; tachycardia; hypotension; tachypnea; respiratory)     Dates: Start: 09/18/19       Description:           Intervention: Central venous catheter management per bundle (eg: Lines maintained; port sterility maintained; dressing changed per policy and procedure)     Dates: Start: 09/18/19       Description:                       Problem: Infection - Risk of, Urinary Catheter-Associated Urinary Tract Infection     Dates: Start: 09/18/19       Description: Pt will remain free of UTI during stay at [facility] aeb lack of s/s of infection. Disciplines: Interdisciplinary    Goal: *Absence of infection signs and symptoms     Dates: Start: 09/18/19   Expected End: 09/26/19       Description:     Disciplines: Interdisciplinary    Intervention: Urine assessment (eg: Clarity; color; odor; volume)     Dates: Start: 09/18/19       Description:           Intervention: Infection signs and symptoms monitoring - urinary tract (eg: Flank or suprapubic pain; burning; fever; dysuria; frequency; urgency; cloudy/bloody/foul odor urine; confusion/agitation; incontinence)     Dates: Start: 09/18/19       Description:           Intervention: Urinary catheter management (eg: Closed urinary catheter system maintenance; urinary catheter patency with free downhill flow; perineal care; monitor intake and output)     Dates: Start: 09/18/19       Description:           Intervention: Urinary catheter discontinuation     Dates: Start: 09/18/19       Description: REMINDER(s):  Urinary catheter discontinuation promptly as indicated; remind physician to remove at or before day 5.                       Problem: Pain     Dates: Start: 09/18/19       Description:     Disciplines: Interdisciplinary    Goal: Verbalize satisfaction of level of comfort and symptom control     Dates: Start: 09/18/19   Expected End: 09/26/19       Description: Pt will remain free of pain during stay at [facility] aeb FLACC score of 2 or less    Medication:  Dilaudid 0.5 mg IV/SQ q 30 minutes PRN pain      Disciplines: Interdisciplinary    Intervention: Assess effectiveness of pain management     Dates: Start: 09/18/19       Description:           Intervention: Assess for signs/symptoms of acute pain Dates: Start: 09/18/19       Description:           Intervention: Assess for signs/symptoms of chronic pain     Dates: Start: 09/18/19       Description:           Intervention: Instruct on non-pharmacological pain management     Dates: Start: 09/18/19       Description:           Intervention: Instruct on pain scales     Dates: Start: 09/18/19       Description:           Intervention: Instruct on pharmacological pain management     Dates: Start: 09/18/19       Description:                       Problem: Pressure Injury - Risk of     Dates: Start: 09/18/19       Description:     Disciplines: Interdisciplinary    Goal: *Prevention of pressure injury     Dates: Start: 09/18/19   Expected End: 09/26/19       Description: Document Salvatore Scale and appropriate interventions in the flowsheet. Disciplines: Interdisciplinary                Problem: Pshychosocial General     Dates: Start: 09/19/19       Disciplines: Interdisciplinary    Goal: Psychosocial Needs     Dates: Start: 09/19/19   Expected End: 09/23/19       Description: Problem: SW: Patient Standard/ Initial Plan of Care-Social Work    Goals: 1. Patient and family will experience a smooth and safe transition to hospice care   2. Long Term Goal: To minimize pain and discomfort while maximizing quality of life for the \  patient and providing support for the family /patient caregiver. Intervention: 1. Social Work Assessment            2. Assess patient /family/ caregiver expectations from hospice. 3. Assess desires regarding end-of-life issues. 4. Assess degrees and stages of grief. 5. Assessment of pain and coping at every visit. 6. Assess the patient psycho-social, emotional cultural implications of pain. 7. Assess mental/ emotional status and need for additional resources every visit. 8. Assess PCG anxiety and fatigue in care plan delivery.         Disciplines: Interdisciplinary Problem: Spiritual Distress     Dates: Start: 09/18/19       Description:     Disciplines: Interdisciplinary    Goal: Distress heard, acknowledged, and addressed     Dates: Start: 09/18/19   Expected End: 09/27/19       Description: Patient/family/caregiver distress will be heard, acknowledged, and addressed throughout hospice care. Disciplines: Interdisciplinary    Intervention: Assess for signs/symptoms of spiritual distress     Dates: Start: 09/18/19       Description:           Intervention: Consult on spiritual care     Dates: Start: 09/18/19       Description: Consult to Spiritual Care          Intervention: Discuss strategies to reduce spiritual distress     Dates: Start: 09/18/19       Description: Discuss with patient/caregiver strategies to reduce spiritual distress. Problem: Spiritual Evaluation     Dates: Start: 09/19/19       Description:     Disciplines: Interdisciplinary    Goal: Identify beliefs/practices that support hospice experience     Dates: Start: 09/19/19       Description: Patient/family identify their beliefs/practices that impair Hospice experience. Patient/family identify their beliefs/practices that support Hospice experience. Patient coping identified. Spiritual distress identified and decreased with visit. Disciplines: Interdisciplinary    Intervention: Assess spiritual needs     Dates: Start: 09/19/19       Description: Assist with spiritual questions          Intervention: Assist with spiritual resources     Dates: Start: 09/19/19       Description:           Intervention: Provide spiritual support     Dates: Start: 09/19/19       Description: Assist with coordination of spiritual needs. Needs may include communion, anointing, / visits, and fear.                          Care Plan Problems/Goals      Progressing Towards Goal (11)     *Absence of Falls     Problem: Falls - Risk of    Disciplines: Interdisciplinary    Expected end:  09/26/19     Progressing Towards Goal By Roseanna Zamora RN on 09/20/19 1117               *Prevention of pressure injury     Problem: Pressure Injury - Risk of    Disciplines: Interdisciplinary    Expected end:  09/26/19     Progressing Towards Goal By Roseanna Zamora RN on 09/20/19 1117               Verbalize satisfaction of level of comfort and symptom control     Problem: Pain    Disciplines: Interdisciplinary    Expected end:  09/26/19     Progressing Towards Goal By Roseanna Zamora RN on 09/20/19 1117               Explore reactions to and verbalize acceptance of impending loss     Problem: Anticipatory Grief    Disciplines: Interdisciplinary    Expected end:  09/26/19     Progressing Towards Goal By Dionna Allen RN on 09/20/19 9160               Demonstrate acceptance of terminal illness and understanding of disease progression     Problem: Coping and Emotional Distress    Disciplines: Interdisciplinary    Expected end:  09/27/19     Progressing Towards Goal By Dionna Allen RN on 09/20/19 0308               Distress heard, acknowledged, and addressed     Problem: Spiritual Distress    Disciplines: Interdisciplinary    Expected end:  09/27/19     Progressing Towards Goal By Dionna Allen RN on 09/20/19 0308               Demonstrate understanding of end of life processes     Problem: End of Life Process    Disciplines: Interdisciplinary    Expected end:  09/27/19     Progressing Towards Goal By Dionna Allen RN on 09/20/19 0308               *Absence of infection signs and symptoms     Problem: Infection - Risk of, Urinary Catheter-Associated Urinary Tract Infection    Disciplines: Interdisciplinary    Expected end:  09/26/19     Progressing Towards Goal By Roseanna Zamora RN on 09/20/19 1117               *Absence of infection signs and symptoms     Problem: Infection - Risk of, Central Venous Catheter-Associated Bloodstream Infection    Disciplines: Nurse, Interdisciplinary, RT    Expected end:  09/26/19     Progressing Towards Goal By Jackie vIerson RN on 09/20/19 1117               Psychosocial Needs     Problem: Pshychosocial General    Disciplines: Interdisciplinary    Expected end:  09/23/19     Progressing Towards Goal By Gissell Franco on 09/20/19 1109               Patient will fill out appropriate document(s)     Problem: Advance Directive, Legal Document Needs    Disciplines: Interdisciplinary    Expected end:  09/23/19     Progressing Towards Goal By Gissell Franco on 09/20/19 1109                      No Outcome (2)     Grief heard and acknowledged, anxiety reduced, patient coping identified, patient/family expressed gratitude     Problem: Anticipatory Grief    Disciplines:     Expected end:  -                 Identify beliefs/practices that support hospice experience     Problem: Spiritual Evaluation    Disciplines:     Expected end:  -                             ___________________    Care Team Notes          POC/IDG Notes      [facility] ID  Notes by Anabel Nuñez at 09/20/19 1219  Version 1 of 1    Author:  Anabel Nuñez Service:  Spiritual Care Author Type:  Pastoral Care    Filed:  09/20/19 1225 Date of Service:  09/20/19 1219 Status:  Signed    :  Anabel Nuñez (Pastoral Care)       Patient: Natalee Martinez    Date: 09/20/19  Time: 12:19 PM    [facility]  Notes  Intervention: Initial spiritual and bereavement assessments completed. Literature provided. Exploration of family coping. Compassion, affirmation of isra and prayer. Outcome: Family expressed acceptance of patient's impending death. Family open to spiritual support. They have pastoral support from their [employer]. Plan:  Continued support during patient's stay with [facility].   See Care Plan        Signed by: Steve Stone       [address] [place] [address] Nurse Notes by Uday Greenwood RN at 09/20/19 1219  Version 1 of 1    Author:  Uday Greenwood RN Service:  Chucho Course Author Type: Registered Nurse    Filed:  09/20/19 1223 Date of Service:  09/20/19 1219 Status:  Signed    :  Norm Acuna RN (Registered Nurse)       Patient: Jennifer Garay    Date: 09/20/19  Time: 12:19 PM    \[facility]\"" Nurse Notes  Pt is febrile 100.1. Kimble with 75 mls in last 24 hours. BP 95/55. Dilaudid 0.5 x 3, Haldol 2 mg x 3 doses in the last 24 hours for pain and agitation. Pt is NPO day 4 post IVF. Excoriation to buttocks. R IJ is being utilized for mediation administration. Pt is obtunded. Skin tear to L upper arm. Comprehensive plan of care reviewed. IDG and pt./family in agreement with plan of care. The IDG identifies through on-going assessment when a change is needed to the POC; the pt/family will receive care and services necessitated by changes in POC. Medications reviewed by the pharmacist and Medical Director. Signed by: Rossi Vance RN       [facility] IDG  Notes by Malgorzata Zamora at 09/20/19 5718  Version 1 of 1    Author:  Malgorzata Zamora Service:  Licensed Clinical  Author Type:      Filed:  09/20/19 1222 Date of Service:  09/20/19 0849 Status:  Signed    :  Malgorzata Zamora ()       Patient: Jennifer Garay    Date: 09/20/19  Time: 8:49 AM    \[facility]\""  Notes  LMSW will continue to provide emotional support. Family has been given cremation resources for them to make a decision. VA burial benefits resources for the family. Aid and attendance for the  spouse of the pt. According to the children their father is declining. Problem: Pshychosocial General  Goal: Psychosocial Needs  Description  Problem: SW: Patient Standard/ Initial Plan of Care-Social Work    Goals: 1. Patient and family will experience a smooth and safe transition to hospice care   2.  Long Term Goal: To minimize pain and discomfort while maximizing quality of life for the \  patient and providing support for the family /patient caregiver. Intervention: 1. Social Work Assessment            2. Assess patient /family/ caregiver expectations from hospice. 3. Assess desires regarding end-of-life issues. 4. Assess degrees and stages of grief. 5. Assessment of pain and coping at every visit. 6. Assess the patient psycho-social, emotional cultural implications of pain. 7. Assess mental/ emotional status and need for additional resources every visit. 8. Assess PCG anxiety and fatigue in care plan delivery. Outcome: Progressing Towards Goal     Problem: Advance Directive, Legal Document Needs  Goal: Patient will fill out appropriate document(s)  Description  Problem:  Planning for Death    Goals:     home will be chosen / arrangements will be made    Interventions:  Assess desires regarding end of life issues  Instruct and support the patient / family through the dying process  Encourage ventilate of feelings / fears  Assist with  home arrangements     2019 1108 by Jostin Velázquez  Outcome: Progressing Towards Goal       Signed by: Farhad Mcneil       [facility] IDG Volunteer Notes by Corie Bello at 19 1120  Version 1 of 1    Author:  Corie Bello Service:  Serg Salazar Author Type:  Hospice Volunteer/    Filed:  19 Date of Service:  19 Status:  Signed    :  Corie Bello (Hospice Volunteer/)       Patient: Vik Walter    Date: 19  Time: 11:20 AM    [facility] Volunteer Notes  Volunteer - Hospice Interdisciplinary Plan of Care Review     Status Codes C=Continued R=Revised RS = Resolved  NV= No visits per Infection Control Policy     C. Volunteer     Volunteers continue to provide visits for companionship and emotional support.              Signed by: Allan GREENBERGG Nurse Notes by Jacqueline Ruiz, PA at 19 7241  Version 2 of 2    Author:  Elena Tee RN Service:  Yumi Forte Author Type:  Registered Nurse    Filed:  09/19/19 7392 Date of Service:  09/18/19 1733 Status:  Addendum    :  Elena Tee RN (Registered Nurse)    Related Notes:  Original Note by Elena Tee RN (Registered Nurse) filed at 09/18/19 1733       Patient: Patricia Davila    Date: 09/18/19  Time: 5:33 PM    [address] Nurse Notes  Pt admitted to [facility] for [facility] level of care with hospice dx of adenocarcinoma of the pancreas and symptoms of pain and dyspnea. On admission pt is responsive to pain. Moans and groans on movement with a FLACC score of 2. Eyes are closed at all times. Right  IJ access is patent. IV access in right hand is present. Hand is puffy and patency not checked at this time. Healing skin tear on left forearm. Kimble catheter is draining clear yellow urine. Buttocks area is reddened and excoriated. BLE both have +1 edema. RR 20 and labored. Oxygen on via nasal canula at 2l/min. Temp ax is 100.6. Pt settled in bed with bed low and SR with HOB elevated.  is speaking with family at this time. Tylenol supp for temp 100.6 ax and morphine 2 mg IV for pain FLACC 2 and dyspnea. Family present in room and orientation to [facility] given. Questions answered.  voices understanding of treatment plan. Pt appears peaceful and settled. Admission complete and initial general hospice care plan initiated which includes spiritual, psychosocial and bereavement. Immediate needs recognized for symptom management . IDG team made aware of plan of care and immediate needs.            Signed by: Cherry Bear RN       [address] IDG Nurse Notes by Elena Tee RN at 09/18/19 1733  Version 1 of 2    Author:  Elena Tee RN Service:  Yumi Forte Author Type:  Registered Nurse    Filed:  09/18/19 1733 Date of Service:  09/18/19 1733 Status:  Signed    :  Elena Tee RN (Registered Nurse)    Related Notes:  Addendum by Elena Tee RN (Registered Nurse) filed at 09/19/19 8783       Patient: Naresh Webb    Date: 09/18/19  Time: 5:33 PM    [address] [place] [address] Nurse Notes  Pt admitted to [facility] [place] [place] for [facility] level of care with hospice dx of adenocarcinoma of the pancreas and symptoms of pain and dyspnea. On admission pt is responsive to pain. Moans and groans on movement with a FLACC score of 2. Eyes are closed at all times. Right  IJ access is patent. IV access in right hand is present. Hand is puffy and patency not checked at this time. Healing skin tear on left forearm. Kimble catheter is draining clear yellow urine. Buttocks area is reddened and excoriated. BLE both have +1 edema. RR 20 and labored. Oxygen on via nasal canula at 2l/min. Temp ax is 100.6. Pt settled in bed with bed low and SR with HOB elevated.  is speaking with family at this time. 1553 Tylenol supp for temp 100.6 ax and morphine 2 mg IV for pain FLACC 2 and dyspnea. 80  Family present in room and orientation to [facility] [place] given. Questions answered.  voices understanding of treatment plan. Pt appears peaceful and settled. Signed by: Polo Borrego RN       [address] [facility]  Notes by Oscar Villalta at 09/19/19 1044  Version 1 of 1    Author:  Oscar Villalta Service:  Licensed Clinical  Author Type:      Filed:  09/19/19 1045 Date of Service:  09/19/19 1044 Status:  Signed    :  Oscar Villalta ()       Patient: Naresh Webb    Date: 09/19/19  Time: 10:44 AM    [facility]  Notes  LMSW has read and LMSW is in agreement with the initial Rn assessment.        Signed by: Gaurav Barrow       [facility] IDG  Notes by Darrell Duque at 09/19/19 1033  Version 1 of 1    Author:  Darrell Duque Service:  Spiritual Care Author Type:  Pastoral Care    Filed:  09/19/19 1034 Date of Service:  09/19/19 1033 Status:  Signed    :  Darrell Duque (Pastoral Care)       Patient: Bella Toribio Emmanuel    Date: 09/19/19  Time: 10:33 AM    [facility]  Notes   has reviewed the Initial Comprehensive Assessment and Initial Plan of Care for  [address] [facility] is in  agreement with the assessment and plans set forth.  will assess for spiritual and bereavement needs and provide care as appropriate. Signed by: Claudia Florez       [address] [place] [address] IDG Volunteer Notes by Hola Casanova at 09/19/19 0950  Version 1 of 1    Author:  Hola Casanova Service:  Jennifer Mayfield Author Type:  Hospice Volunteer/    Filed:  09/19/19 0950 Date of Service:  09/19/19 0950 Status:  Signed    :  Hola Casanova (Hospice Volunteer/)           Patient: Marianna Lee    Date: 09/19/19  Time: 9:50 AM    [facility] Volunteer Notes  VC has reviewed the initial RN Comprehensive assessment and the plan of care. VC is in agreement with the plan of care. Pt will receive general support by volunteers as evidenced by comfort bag delivery, snack cart, supplies to the room, companionship visits, pet therapy and/or therapeutic music. Signed by: Flaco Cervantes IDG  Notes by Jackie Dia at 09/18/19 2355  Version 1 of 1    Author:  Jakcie Dia Service:  Spiritual Care Author Type:  Pastoral Care    Filed:  09/18/19 2356 Date of Service:  09/18/19 2355 Status:  Signed    :  Jackie Dia (Pastoral Care)       Patient: Marianna Lee    Date: 09/18/19  Time: 11:55 PM    [facility]  Notes   has reviewed the Initial Comprehensive Assessment and Initial Plan of Care for  [address] is in  agreement with the assessment and plans set forth.  will assess for spiritual and bereavement needs and provide care as appropriate.              Signed by: Claudia Florez                Care Team Present:   Team Members Present: Physician, Vol Coordinator, Nurse, , [employer], Bereavement  Physician Team Member: Aleyda Chairez MD  Nurse Team Member: Keo Munoz RN  Social Work Team Member: Laila Gaines Team Member: Keyon Velásquez Coordinator: Joie Stock

## 2019-09-21 NOTE — PROGRESS NOTES
Problem: Falls - Risk of  Goal: *Absence of Falls  Description  Document Jasmine Caba Fall Risk and appropriate interventions in the flowsheet. Outcome: Progressing Towards Goal  Note:   Fall Risk Interventions:  Mobility Interventions: Bed/chair exit alarm    Mentation Interventions: Adequate sleep, hydration, pain control, Bed/chair exit alarm, Door open when patient unattended, Familiar objects from home, Family/sitter at bedside, Room close to nurse's station    Medication Interventions: Bed/chair exit alarm    Elimination Interventions: Bed/chair exit alarm, Call light in reach    History of Falls Interventions: Bed/chair exit alarm, Door open when patient unattended, Room close to nurse's station         Problem: Pressure Injury - Risk of  Goal: *Prevention of pressure injury  Description  Document Salvatore Scale and appropriate interventions in the flowsheet.   Outcome: Progressing Towards Goal  Note:   Pressure Injury Interventions:  Sensory Interventions: Assess changes in LOC, Check visual cues for pain, Float heels, Keep linens dry and wrinkle-free, Maintain/enhance activity level, Minimize linen layers, Pad between skin to skin, Pressure redistribution bed/mattress (bed type)    Moisture Interventions: Absorbent underpads, Apply protective barrier, creams and emollients, Internal/External urinary devices, Limit adult briefs, Maintain skin hydration (lotion/cream), Minimize layers, Moisture barrier    Activity Interventions: Pressure redistribution bed/mattress(bed type)    Mobility Interventions: Float heels, HOB 30 degrees or less, Pressure redistribution bed/mattress (bed type)    Nutrition Interventions: (pt not taking PO at this time)    Friction and Shear Interventions: Apply protective barrier, creams and emollients, HOB 30 degrees or less, Lift sheet, Minimize layers                Problem: Pain  Goal: Verbalize satisfaction of level of comfort and symptom control  Description  Pt will remain free of pain during stay at [facility] aeb FLACC score of 2 or less    Medication:  Dilaudid 0.5 mg IV/SQ q 30 minutes PRN pain    Outcome: Progressing Towards Goal     Problem: End of Life Process  Goal: Demonstrate understanding of end of life processes  Description  Patient/caregiver will understand end of life processes.   Outcome: Progressing Towards Goal     Problem: Infection - Risk of, Urinary Catheter-Associated Urinary Tract Infection  Goal: *Absence of infection signs and symptoms  Outcome: Progressing Towards Goal     Problem: Infection - Risk of, Central Venous Catheter-Associated Bloodstream Infection  Goal: *Absence of infection signs and symptoms  Outcome: Progressing Towards Goal

## 2019-09-21 NOTE — PROGRESS NOTES
Problem: Anticipatory Grief  Goal: Grief heard and acknowledged, anxiety reduced, patient coping identified, patient/family expressed gratitude  Description  Family will demonstrate the appropriate expressions of grief and loss. They will identify and communicate their understanding of the grief process as it relates to the anticipation of their loved ones passing. Outcome: Progressing Towards Goal     Problem: Spiritual Evaluation  Goal: Identify beliefs/practices that support hospice experience  Description  Patient/family identify their beliefs/practices that impair Hospice experience. Patient/family identify their beliefs/practices that support Hospice experience. Patient coping identified. Spiritual distress identified and decreased with visit.   Outcome: Progressing Towards Goal

## 2019-09-22 PROCEDURE — 0656 HSPC GENERAL INPATIENT
